# Patient Record
Sex: FEMALE | Race: OTHER | Employment: FULL TIME | URBAN - METROPOLITAN AREA
[De-identification: names, ages, dates, MRNs, and addresses within clinical notes are randomized per-mention and may not be internally consistent; named-entity substitution may affect disease eponyms.]

---

## 2017-03-14 ENCOUNTER — APPOINTMENT (OUTPATIENT)
Dept: GENERAL RADIOLOGY | Age: 47
End: 2017-03-14
Attending: EMERGENCY MEDICINE
Payer: SUBSIDIZED

## 2017-03-14 ENCOUNTER — HOSPITAL ENCOUNTER (OUTPATIENT)
Age: 47
Setting detail: OBSERVATION
LOS: 1 days | Discharge: HOME | End: 2017-03-15
Attending: EMERGENCY MEDICINE | Admitting: FAMILY MEDICINE
Payer: SUBSIDIZED

## 2017-03-14 DIAGNOSIS — R55 SYNCOPE AND COLLAPSE: Primary | ICD-10-CM

## 2017-03-14 LAB
ALBUMIN SERPL BCP-MCNC: 3.6 G/DL (ref 3.4–5)
ALBUMIN/GLOB SERPL: 1 {RATIO} (ref 0.8–1.7)
ALP SERPL-CCNC: 69 U/L (ref 45–117)
ALT SERPL-CCNC: 24 U/L (ref 13–56)
ANION GAP BLD CALC-SCNC: 5 MMOL/L (ref 3–18)
AST SERPL W P-5'-P-CCNC: 16 U/L (ref 15–37)
ATRIAL RATE: 90 BPM
BASOPHILS # BLD AUTO: 0 K/UL (ref 0–0.1)
BASOPHILS # BLD: 0 % (ref 0–2)
BILIRUB SERPL-MCNC: 0.3 MG/DL (ref 0.2–1)
BUN SERPL-MCNC: 10 MG/DL (ref 7–18)
BUN/CREAT SERPL: 14 (ref 12–20)
CALCIUM SERPL-MCNC: 8.6 MG/DL (ref 8.5–10.1)
CALCULATED P AXIS, ECG09: 69 DEGREES
CALCULATED R AXIS, ECG10: 19 DEGREES
CALCULATED T AXIS, ECG11: 8 DEGREES
CHLORIDE SERPL-SCNC: 105 MMOL/L (ref 100–108)
CHOLEST SERPL-MCNC: 180 MG/DL
CK MB CFR SERPL CALC: NORMAL % (ref 0–4)
CK MB CFR SERPL CALC: NORMAL % (ref 0–4)
CK MB SERPL-MCNC: <1 NG/ML (ref 5–25)
CK MB SERPL-MCNC: <1 NG/ML (ref 5–25)
CK SERPL-CCNC: 35 U/L (ref 26–192)
CK SERPL-CCNC: 35 U/L (ref 26–192)
CO2 SERPL-SCNC: 29 MMOL/L (ref 21–32)
CREAT SERPL-MCNC: 0.74 MG/DL (ref 0.6–1.3)
DIAGNOSIS, 93000: NORMAL
DIFFERENTIAL METHOD BLD: ABNORMAL
EOSINOPHIL # BLD: 0 K/UL (ref 0–0.4)
EOSINOPHIL NFR BLD: 0 % (ref 0–5)
ERYTHROCYTE [DISTWIDTH] IN BLOOD BY AUTOMATED COUNT: 12.9 % (ref 11.6–14.5)
GLOBULIN SER CALC-MCNC: 3.7 G/DL (ref 2–4)
GLUCOSE SERPL-MCNC: 97 MG/DL (ref 74–99)
HCG UR QL: NEGATIVE
HCT VFR BLD AUTO: 37.4 % (ref 35–45)
HDLC SERPL-MCNC: 34 MG/DL (ref 40–60)
HDLC SERPL: 5.3 {RATIO} (ref 0–5)
HGB BLD-MCNC: 12.6 G/DL (ref 12–16)
LDLC SERPL CALC-MCNC: 126.2 MG/DL (ref 0–100)
LIPID PROFILE,FLP: ABNORMAL
LYMPHOCYTES # BLD AUTO: 13 % (ref 21–52)
LYMPHOCYTES # BLD: 1 K/UL (ref 0.9–3.6)
MCH RBC QN AUTO: 29.4 PG (ref 24–34)
MCHC RBC AUTO-ENTMCNC: 33.7 G/DL (ref 31–37)
MCV RBC AUTO: 87.2 FL (ref 74–97)
MONOCYTES # BLD: 0.7 K/UL (ref 0.05–1.2)
MONOCYTES NFR BLD AUTO: 10 % (ref 3–10)
NEUTS SEG # BLD: 5.6 K/UL (ref 1.8–8)
NEUTS SEG NFR BLD AUTO: 77 % (ref 40–73)
P-R INTERVAL, ECG05: 152 MS
PLATELET # BLD AUTO: 207 K/UL (ref 135–420)
PMV BLD AUTO: 10.1 FL (ref 9.2–11.8)
POTASSIUM SERPL-SCNC: 3.7 MMOL/L (ref 3.5–5.5)
PROT SERPL-MCNC: 7.3 G/DL (ref 6.4–8.2)
Q-T INTERVAL, ECG07: 352 MS
QRS DURATION, ECG06: 82 MS
QTC CALCULATION (BEZET), ECG08: 430 MS
RBC # BLD AUTO: 4.29 M/UL (ref 4.2–5.3)
SODIUM SERPL-SCNC: 139 MMOL/L (ref 136–145)
TRIGL SERPL-MCNC: 99 MG/DL (ref ?–150)
TROPONIN I SERPL-MCNC: <0.02 NG/ML (ref 0–0.04)
TROPONIN I SERPL-MCNC: <0.02 NG/ML (ref 0–0.04)
VENTRICULAR RATE, ECG03: 90 BPM
VLDLC SERPL CALC-MCNC: 19.8 MG/DL
WBC # BLD AUTO: 7.3 K/UL (ref 4.6–13.2)

## 2017-03-14 PROCEDURE — 81025 URINE PREGNANCY TEST: CPT | Performed by: EMERGENCY MEDICINE

## 2017-03-14 PROCEDURE — 93005 ELECTROCARDIOGRAM TRACING: CPT

## 2017-03-14 PROCEDURE — 74011250637 HC RX REV CODE- 250/637: Performed by: FAMILY MEDICINE

## 2017-03-14 PROCEDURE — 85025 COMPLETE CBC W/AUTO DIFF WBC: CPT | Performed by: EMERGENCY MEDICINE

## 2017-03-14 PROCEDURE — 99285 EMERGENCY DEPT VISIT HI MDM: CPT

## 2017-03-14 PROCEDURE — 71010 XR CHEST PORT: CPT

## 2017-03-14 PROCEDURE — 82550 ASSAY OF CK (CPK): CPT | Performed by: EMERGENCY MEDICINE

## 2017-03-14 PROCEDURE — 36415 COLL VENOUS BLD VENIPUNCTURE: CPT | Performed by: FAMILY MEDICINE

## 2017-03-14 PROCEDURE — 80061 LIPID PANEL: CPT | Performed by: FAMILY MEDICINE

## 2017-03-14 PROCEDURE — 65660000000 HC RM CCU STEPDOWN

## 2017-03-14 PROCEDURE — 80053 COMPREHEN METABOLIC PANEL: CPT | Performed by: EMERGENCY MEDICINE

## 2017-03-14 RX ORDER — ACETAMINOPHEN 325 MG/1
650 TABLET ORAL
Status: DISCONTINUED | OUTPATIENT
Start: 2017-03-14 | End: 2017-03-15 | Stop reason: HOSPADM

## 2017-03-14 RX ORDER — IBUPROFEN 200 MG
200 TABLET ORAL
COMMUNITY
End: 2017-08-09

## 2017-03-14 RX ORDER — SODIUM CHLORIDE 9 MG/ML
500 INJECTION, SOLUTION INTRAVENOUS ONCE
Status: COMPLETED | OUTPATIENT
Start: 2017-03-15 | End: 2017-03-15

## 2017-03-14 RX ADMIN — ACETAMINOPHEN 650 MG: 325 TABLET ORAL at 22:23

## 2017-03-14 NOTE — ED TRIAGE NOTES
BIBA from home for tired and weak for a couple days; medic states family told him that pt had a syncopal episode for unknown LOC. Pt is [place] speaking.

## 2017-03-14 NOTE — ROUTINE PROCESS
TRANSFER - IN REPORT:    Verbal report received from Jennyfer Perez RN(name) on [address]  being received from ED(unit) for routine progression of care      Report consisted of patients Situation, Background, Assessment and   Recommendations(SBAR). Information from the following report(s) SBAR, Kardex and MAR was reviewed with the receiving nurse. Opportunity for questions and clarification was provided. Assessment completed upon patients arrival to unit and care assumed.

## 2017-03-14 NOTE — ED NOTES
TRANSFER - OUT REPORT:    Verbal report given to MARICHUY Acosta (name) on Sandra Cohen  being transferred to  (unit) for routine progression of care       Report consisted of patients Situation, Background, Assessment and   Recommendations(SBAR). Information from the following report(s) SBAR, ED Summary and MAR was reviewed with the receiving nurse. Lines:   Peripheral IV 03/14/17 Left Antecubital (Active)   Site Assessment Clean, dry, & intact 3/14/2017  6:43 PM   Phlebitis Assessment 0 3/14/2017  6:43 PM   Infiltration Assessment 0 3/14/2017  6:43 PM   Dressing Status Clean, dry, & intact 3/14/2017  6:43 PM   Dressing Type Transparent 3/14/2017  6:43 PM   Hub Color/Line Status Patent; Flushed 3/14/2017  6:43 PM        Opportunity for questions and clarification was provided.       Patient transported with:   Registered Nurse

## 2017-03-14 NOTE — H&P
Admission History and Physical  [facility]      Patient: Melinda Swan MRN: [id number]  [facility]: [id number]    YOB: 1970  Age: 49 y.o. Sex: female      DOA: 3/14/2017       HPI:     Melinda Swan is a 49 y.o. female with PMH GERD and pertinent family history of sudden death at young age, now presenting with complaint of syncopal episode. Patient reports that she woke up yesterday morning with full body pain that worsened throughout the day. She was able to go to work, but stated that pain progressively worsened. Yesterday evening, she took 400mg of ibuprofen and was able to go to sleep. When she woke up this morning, she continued to feel full body pain, describing it as muscle pain. She took her daughter to school, then returned home and went to sleep. She awoke around 1100 and poured herself a soda. She sat on the couch and felt very warm, then experienced loss of consciousness. Her family was present during this episode, and stated that she was slumped over on the couch. Her brother lifted her up, and her body was noted to be completely flaccid. He put her on the ground and \"started CPR\" per family. They described this as pushing down on her chest a few times, and blowing in her mouth. When they were blowing into her mouth, she awoke. Her family is not certain if she was breathing or had a pulse during this episode. The patient reports that she only remembers sitting on the couch, then waking up on the ground with family all around her. The patient reports that she has been experiencing chest pain and shortness of breath for the past 5-6 months, which has started to become more frequent. She describes the pain as sharp and pinching, and lasting 1-2 minutes. Originally it would happen once per month or so, but has increased to approximately once weekly.   She has a pertinent family history of MI in her paternal uncle at age 46, stating that he  at work unexpectedly from a massive heart attack. Her brother is on several medications for hypertension and \"heart problems,\" but she is unsure about the drug names. Her paternal grandfather also had an MI in his [de-identified]. She does endorse occasional epigastric pain. She denied taking prilosec as suggested by outpatient doctor. She endorsed frequent belching and sour taste in mouth. Patient does not have health insurance, and would appreciate any assistance available. She denied nausea, vomiting, diarrhea, constipation, and sick contacts. She also denied history of anxiety or increase in stress. Review of Systems - General ROS: negative for  - chills, fever, night sweats, weight gain and weight loss  Psychological ROS: negative for - anxiety and depression  Ophthalmic ROS: negative for - blurry vision, decreased vision or loss of vision  ENT ROS: negative for - headaches, hearing change or visual changes  Hematological and Lymphatic ROS: negative for - bruising, jaundice  Respiratory ROS: negative for - cough, hemoptysis, shortness of breath, orthopnea, paroxysmal dyspnea, or wheezing  Cardiovascular ROS: chest pain, dyspnea on exertion negative for -  edema, loss of consciousness, or palpitations   Gastrointestinal ROS: abdominal pain negative for - blood in stools, change in stools, constipation, diarrhea, hematemesis, melena, nausea/vomiting or swallowing difficulty/pain  Genito-Urinary ROS: negative for - dysuria, hematuria or urinary frequency/urgency  Musculoskeletal ROS: negative for - joint pain, joint swelling or muscle pain  Neurological ROS: negative for - dizziness, headaches, numbness/tingling or weakness  Dermatological ROS: negative for - rash or skin lesion changes      No past medical history on file.     Past Surgical History:   Procedure Laterality Date    HX HYSTERECTOMY         Family History   Problem Relation Age of Onset    Breast Cancer Paternal Aunt     Ovarian Cancer Paternal Grandmother        Social History     Social History    Marital status:      Spouse name: N/A    Number of children: N/A    Years of education: N/A     Social History Main Topics    Smoking status: Never Smoker    Smokeless tobacco: Not on file    Alcohol use No    Drug use: No    Sexual activity: Not on file     Other Topics Concern    Not on file     Social History Narrative       No Known Allergies    Prior to Admission Medications   Prescriptions Last Dose Informant Patient Reported? Taking? HYDROcodone-acetaminophen (NORCO) 5-325 mg per tablet   No No   Sig: Take 1 tablet by mouth every four (4) hours as needed for Pain. Facility-Administered Medications: None       Physical Exam:     Patient Vitals for the past 24 hrs:   Temp Pulse Resp BP SpO2   03/14/17 1430 - 88 14 102/61 -   03/14/17 1415 - 88 16 100/58 -   03/14/17 1400 - 89 13 97/59 -   03/14/17 1345 - 88 15 99/57 -   03/14/17 1330 - 87 11 101/59 -   03/14/17 1315 - 88 13 105/64 -   03/14/17 1314 99.1 °F (37.3 °C) 88 11 95/55 94 %       Physical Exam:   General:  Alert and Responsive and in No acute distress. Primary language is Citizen of [place], but conversive in [place]. HEENT: Conjunctiva pink, sclera anicteric. EOMI. Pharynx moist, nonerythematous. Moist mucous membranes. Thyroid not enlarged, no nodules. No cervical, supraclavicular, occipital or submandibular lymphadenopathy. No other gross abnormalities present. CV:  RRR, no murmurs. No visible pulsations or thrills. RESP:  Unlabored breathing. Lungs clear to auscultation. no wheeze, rales, or rhonchi. Equal expansion bilaterally. ABD:  Soft, nondistended. Mild TTP in epigastric region. Mild TTP in LLQ, no rebound. No hepatosplenomegaly. No suprapubic tenderness. No CVA tenderness. RECTAL:  Patient declined  MS:  No joint deformity or instability. No atrophy. Neuro:  5/5 strength bilateral upper extremities and lower extremities. A+Ox3. Ext:  No edema.   2+ radial and dp pulses bilaterally. Skin:  No rashes, lesions, or ulcers. Good turgor. IMAGING:   CXR 3/14/2017  Impression:  1. No acute cardiopulmonary process. Recent Results (from the past 12 hour(s))   EKG, 12 LEAD, INITIAL    Collection Time: 03/14/17  1:55 PM   Result Value Ref Range    Ventricular Rate 90 BPM    Atrial Rate 90 BPM    P-R Interval 152 ms    QRS Duration 82 ms    Q-T Interval 352 ms    QTC Calculation (Bezet) 430 ms    Calculated P Axis 69 degrees    Calculated R Axis 19 degrees    Calculated T Axis 8 degrees    Diagnosis       Normal sinus rhythm  Possible Left atrial enlargement  Nonspecific ST and T wave abnormality  Abnormal ECG  No previous ECGs available  Confirmed by Ritu Moser MD, Irving Barajas ([id number]) on 3/14/2017 3:20:33 PM     CBC WITH AUTOMATED DIFF    Collection Time: 03/14/17  2:35 PM   Result Value Ref Range    WBC 7.3 4.6 - 13.2 K/uL    RBC 4.29 4. 20 - 5.30 M/uL    HGB 12.6 12.0 - 16.0 g/dL    HCT 37.4 35.0 - 45.0 %    MCV 87.2 74.0 - 97.0 FL    MCH 29.4 24.0 - 34.0 PG    MCHC 33.7 31.0 - 37.0 g/dL    RDW 12.9 11.6 - 14.5 %    PLATELET 654 634 - 960 K/uL    MPV 10.1 9.2 - 11.8 FL    NEUTROPHILS 77 (H) 40 - 73 %    LYMPHOCYTES 13 (L) 21 - 52 %    MONOCYTES 10 3 - 10 %    EOSINOPHILS 0 0 - 5 %    BASOPHILS 0 0 - 2 %    ABS. NEUTROPHILS 5.6 1.8 - 8.0 K/UL    ABS. LYMPHOCYTES 1.0 0.9 - 3.6 K/UL    ABS. MONOCYTES 0.7 0.05 - 1.2 K/UL    ABS. EOSINOPHILS 0.0 0.0 - 0.4 K/UL    ABS.  BASOPHILS 0.0 0.0 - 0.1 K/UL    DF AUTOMATED     METABOLIC PANEL, COMPREHENSIVE    Collection Time: 03/14/17  2:35 PM   Result Value Ref Range    Sodium 139 136 - 145 mmol/L    Potassium 3.7 3.5 - 5.5 mmol/L    Chloride 105 100 - 108 mmol/L    CO2 29 21 - 32 mmol/L    Anion gap 5 3.0 - 18 mmol/L    Glucose 97 74 - 99 mg/dL    BUN 10 7.0 - 18 MG/DL    Creatinine 0.74 0.6 - 1.3 MG/DL    BUN/Creatinine ratio 14 12 - 20      GFR est AA >60 >60 ml/min/1.73m2    GFR est non-AA >60 >60 ml/min/1.73m2    Calcium 8.6 8.5 - 10.1 MG/DL    Bilirubin, total 0.3 0.2 - 1.0 MG/DL    ALT (SGPT) 24 13 - 56 U/L    AST (SGOT) 16 15 - 37 U/L    Alk. phosphatase 69 45 - 117 U/L    Protein, total 7.3 6.4 - 8.2 g/dL    Albumin 3.6 3.4 - 5.0 g/dL    Globulin 3.7 2.0 - 4.0 g/dL    A-G Ratio 1.0 0.8 - 1.7     CARDIAC PANEL,(CK, CKMB & TROPONIN)    Collection Time: 03/14/17  2:35 PM   Result Value Ref Range    CK 35 26 - 192 U/L    CK - MB <1.0 <3.6 ng/ml    CK-MB Index Cannot be calulated 0.0 - 4.0 %    Troponin-I, Qt. <0.02 0.0 - 0.045 NG/ML   HCG URINE, QL    Collection Time: 03/14/17  2:55 PM   Result Value Ref Range    HCG urine, Ql. NEGATIVE  NEG           Assessment/Plan:   49 y.o. female with PMH GERD and pertinent family history of sudden death at young age, now presenting with complaint of syncopal episode. Syncopal Episode:  DDx includes dehydration vs arhythmia. Cardiac enzymes negative in the ED. History concerning for episodes of chest pain becoming more frequent, and unexpected death in family member at young age. EKG read by ED as concerning for ST depressions  In leads II, III, avF. No prior EKG for comparison. CXR with no acute cardiopulmonary findings. - Will admit to telemetry. - Will consult cardiology. - Will obtain serial troponins.   - Will obtain lipid profile, consider echo inpatient vs outpatient per cardiology rec. - Will repeat EKG in the AM.  - Will obtain orthostatic vital signs. GERD  - Patient not taking prilosec as suggested by PCP. - Will order gi cocktail. Barrier to Care:  Patient does not have health insurance. - Will consult case management for assistance. Diet: Redular  DVT Prophylaxis: SCDs  Code Status: FULL  Point of Contact: Liborio Graff (Relationship: brother) [phone]    Disposition and anticipated LOS: >/= 2 midnights.     Amada Sanchez MD  03/14/17    4:58 PM            Senior Addendum to History and Physical  [facility]      Patient: Uvaldo Bamberger MRN: [id number]  [facility]: [id number]    YOB: 1970  Age: 49 y.o. Sex: female      DOA: 3/14/2017       HPI:     Chris Hong is a 49 y.o. female with PMH of GERD and gestational diabetes, now presenting with complaint of syncope. Per patient she was home this morning sitting on the couch when she felt hot and pain all over her body at around 11:30AM. She then all of a sudden became flaccid, pale with \"cold, purple fingers\". She then took off her clothes because she was hot. Her brother then wanted to take her to ER and she stated that she wanted to be taken to patient first. She then became limp and was laid to the ground. Her sister gave her some sugar and started chest compressions and mouth to mouth resuscitation when she awoke. Her eyes and mouth were closed and she had no flailing movements, foaming at the mouth, loss of bowel or bladder function. She has no recollection of the conversation with brother and sister states she was \"out\" for about 5 minutes. After regaining consciousness she did not have any repeat episodes. She states she had a similar episode once after [employer] class several years ago but she did not lose consciousness and was back to normal after a glass of coffee. She does state that she has been having chest pain intermittently about once per week associated with shortness of breath. She denies any preceding stressful events but did have cold like symptoms and body aches yesterday that required her to call out of work. Family history is significant for brother with arrhythmias, grandfather who  of MI but he was >81yo. ROS: Denies any current chest pain, palpitations, shortness of breath,   ED course: cardiac panel, CBC, neg HCG, CMP. Cxray, EKG    Physical Exam:     Physical Exam:  General:  Alert and Responsive and in No acute distress. HEENT: Conjunctiva pink, sclera anicteric. PERRL. EOMI. Pharynx moist, nonerythematous. Moist mucous membranes.   Thyroid not enlarged, no nodules. Malar type facial rash. CV:  RRR, no murmurs. No visible pulsations or thrills. RESP:  Unlabored breathing. Lungs clear to auscultation. no wheeze, rales, or rhonchi. Equal expansion bilaterally. ABD:  Soft, non-distended, mild epigastric tenderness to palpation. Normoactive bowel sounds. No hepatosplenomegaly. No suprapubic tenderness. No CVA tenderness. RECTAL: per intern note  MS:  No joint deformity or instability. No atrophy. Neuro:  5/5 strength bilateral upper extremities and lower extremities. CN II-XII intact. Sensation grossly intact. A+Ox3. Ext:  No edema. 2+ radial and dp pulses bilaterally. Skin:  No rashes, lesions, or ulcers. Good turgor. Assessment/Plan:   49 y.o. female with PMH of GERD, now admitted for syncope workup, r/o cardiac pathology. Syncope with brief loss of consciousness. Differential includes vasovagal, arrhythmias, structural cardiac disease, ACS, seizures. EKG with possible ST depressions. Pregnancy ruled out.    --Admit to telemetry unit  --continue to trend serial troponins  --lipid profile  --Orthostatic blood pressures  --Cardiology consult, consider ECHO to r/o cardiac pathology inpatient vs outpatient  --consider EEG  --repeat EKG in AM    GERD, symptomatic  --Prilosec   --GI cocktail    Maryann Primrose, MD, PGY-2  [facility]  3/14/2017, 4:39 PM

## 2017-03-14 NOTE — ED NOTES
Pt brother has arrived ans speaks English and states that pt has been feeling weak and having body aches for days and this morning she drank a Sprite and then became really pale and passed out for about 5 min.

## 2017-03-14 NOTE — ED PROVIDER NOTES
HPI Comments: 49year old female presetns with syncope. Pt has not been feeling well fora few days and was at a table this morning  When she went to get some sprite, went and sat on the couch and then  passed out. She denies any headache chest pain sob or abd pain prior to or after the event. No other aggravating or alleviating factors. No other associated symptoms. Patient is a 49 y.o. female presenting with lethargy. Lethargy          No past medical history on file. Past Surgical History:   Procedure Laterality Date    HX HYSTERECTOMY           Family History:   Problem Relation Age of Onset    Breast Cancer Paternal Aunt     Ovarian Cancer Paternal Grandmother        Social History     Social History    Marital status:      Spouse name: N/A    Number of children: N/A    Years of education: N/A     Occupational History    Not on file. Social History Main Topics    Smoking status: Never Smoker    Smokeless tobacco: Not on file    Alcohol use No    Drug use: No    Sexual activity: Not on file     Other Topics Concern    Not on file     Social History Narrative         ALLERGIES: Review of patient's allergies indicates no known allergies. Review of Systems   All other systems reviewed and are negative. Vitals:    03/14/17 1345 03/14/17 1400 03/14/17 1415 03/14/17 1430   BP: 99/57 97/59 100/58 102/61   Pulse: 88 89 88 88   Resp: 15 13 16 14   Temp:       SpO2:       Weight:       Height:                Physical Exam   Constitutional: She is oriented to person, place, and time. She appears well-developed. HENT:   Head: Normocephalic and atraumatic. Eyes: EOM are normal. Pupils are equal, round, and reactive to light. Neck: Normal range of motion. Neck supple. Cardiovascular: Normal rate, regular rhythm and normal heart sounds. Exam reveals no friction rub. No murmur heard. Pulmonary/Chest: Effort normal and breath sounds normal. No respiratory distress.  She has no wheezes. Abdominal: Soft. She exhibits no distension. There is no tenderness. There is no rebound and no guarding. Musculoskeletal: Normal range of motion. Neurological: She is alert and oriented to person, place, and time. Skin: Skin is warm and dry. Psychiatric: She has a normal mood and affect. Her behavior is normal. Thought content normal.        MDM  Number of Diagnoses or Management Options  Syncope and collapse:   Diagnosis management comments: EKG nsr at 90;nl axis. Nl int no konrad/ no hypertrophy. ST dep III, II, F TWI 3. No prior. Syncope; neg fam hx of sudden cardiac death.   + sig fam hx of sudden death ~ 50-s ? MI.   cxr napd. Will d/c PFM for admission. Syncope may be orthostatic but EKG with inf depressions is concerning, no priors.      ED Course       Procedures

## 2017-03-14 NOTE — ED NOTES
Attempted to pull GI cocktail to give to pt, but could not because [facility] was out of med.  [phone] who said they would come refill the med

## 2017-03-14 NOTE — IP AVS SNAPSHOT
Wanda Ward 
 
 
 [address] [address] Bucky Reyke Patient: Linda Dubose MRN: [id number] :1970 You are allergic to the following No active allergies Recent Documentation Height Weight Breastfeeding? BMI OB Status Smoking Status 1.626 m 63.5 kg No 24.03 kg/m2 Hysterectomy Never Smoker Emergency Contacts Name Discharge Info Relation Home Work Mobile Dave Orosco  Other Relative [6] [phone] About your hospitalization You were admitted on:  2017 You last received care in the:  [address] [place] [address] You were discharged on:  March 15, 2017 Unit phone number:  [phone] Why you were hospitalized Your primary diagnosis was:  Syncope And Collapse Your diagnoses also included:  Syncope Providers Seen During Your Hospitalizations Provider Role Specialty Primary office phone Charo Webster MD Attending Provider Emergency Medicine [phone] Lola Ball MD Attending Provider [facility] [phone] Your Primary Care Physician (PCP) Primary Care Physician Office Phone Office Fax lon [facility] [phone] Follow-up Information Follow up With Details Comments Contact Info Cecy Merino MD  office will call patient if able to schedule appointment, if unable, office  will call  to find new PCP. [address] Larissa Roger [facility] [address] [phone] Mari Mart MD On 3/30/2017 at 8:20am 27 [facility] [address] [place] [address] 
[phone] Current Discharge Medication List  
  
CONTINUE these medications which have NOT CHANGED Dose & Instructions Dispensing Information Comments Morning Noon Evening Bedtime  
 ibuprofen 200 mg tablet Commonly known as:  MOTRIN Your last dose was:     
   
Your next dose is:    
   
   
 Dose:  200 mg  
 Take 200 mg by mouth two (2) times daily as needed for Pain (pain as needed). Refills:  0 Discharge Instructions DISCHARGE SUMMARY from Nurse The following personal items are in your possession at time of discharge: 
 
Dental Appliances: None Visual Aid: None Home Medications: None Jewelry: None Clothing: At bedside, Other (comment) Other Valuables: At bedside, Cell Phone Personal Items Sent to Safe:  (none) PATIENT INSTRUCTIONS: 
 
 
F-face looks uneven A-arms unable to move or move unevenly S-speech slurred or non-existent T-time-call 911 as soon as signs and symptoms begin-DO NOT go Back to bed or wait to see if you get better-TIME IS BRAIN. Warning Signs of HEART ATTACK Call 911 if you have these symptoms: 
? Chest discomfort. Most heart attacks involve discomfort in the center of the chest that lasts more than a few minutes, or that goes away and comes back. It can feel like uncomfortable pressure, squeezing, fullness, or pain. ? Discomfort in other areas of the upper body. Symptoms can include pain or discomfort in one or both arms, the back, neck, jaw, or stomach. ? Shortness of breath with or without chest discomfort. ? Other signs may include breaking out in a cold sweat, nausea, or lightheadedness. Don't wait more than five minutes to call [address]! Fast action can save your life. Calling 911 is almost always the fastest way to get lifesaving treatment. Emergency Medical Services staff can begin treatment when they arrive  up to an hour sooner than if someone gets to the hospital by car. The discharge information has been reviewed with the patient and family. The patient and family verbalized understanding. Discharge medications reviewed with the patient and family and appropriate educational materials and side effects teaching were provided. Patient armband removed and shredded Desmayos: Instrucciones de cuidado - [ Fainting: Care Instructions ] Instrucciones de cuidado Cuando se desmaya, o pierde el conocimiento, usted está inconsciente por un corto tiempo. El desmayo suele ser causado por tayo breve disminución del flujo sanguíneo al cerebro. Al caer o quedar tendido, le fluye más nakia al cerebro y recupera el conocimiento. El estrés emocional, el dolor o el acaloramiento, sobre todo si rodriguez estado de pie, pueden hacer que se Cite El Mahrsi 1. En estos casos, el desmayo no suele ser grave. Hermilo el Dodson Sachs puede ser tayo señal de un problema más grave. Quizás bower médico quiera hacerle más pruebas para descartar otras causas. El tratamiento que requiera depende de la causa del Dodson Sachs. El médico lo rodriguez examinado minuciosamente, hermilo pueden presentarse problemas más tarde. Si nota algún problema o nuevos síntomas, busque tratamiento médico de inmediato. La atención de seguimiento es tayo parte clave de bower tratamiento y seguridad. Asegúrese de hacer y acudir a todas las citas, y llame a bower médico si está teniendo problemas. También es tayo buena idea saber los resultados de graeme exámenes y mantener tayo lista de los medicamentos que juliette. Cómo puede cuidarse en el hogar? · Stephanie líquidos en abundancia para prevenir la deshidratación. Si tiene tayo enfermedad renal, cardíaca o hepática y tiene que restringir los líquidos, hable con bower médico antes de aumentar bower consumo de líquidos. Cuándo debe pedir ayuda? Llame al 911 en cualquier momento que piense que puede necesitar atención de [place]. Por ejemplo, llame si: · Tiene síntomas de un problema del corazón. Estos pueden incluir: ¨ Dolor o presión en el pecho. ¨ Graves dificultades para respirar. ¨ Latidos cardíacos rápidos o irregulares. ¨ Aturdimiento o debilidad repentina. ¨ Toser mucosidad espumosa y de DILIP Tran. ¨ Desmayos. Cuando llame al 911, quizás la operadora le diga que mastique 1 aspirina para adultos o de 2 a 4 aspirinas de dosis baja. Espere a la ambulancia. No trate de conducir usted mismo. · Tiene síntomas de un ataque cerebral. Estos pueden incluir: 
¨ Entumecimiento, hormigueo, debilitamiento o pérdida de movimiento repentinos en la guanako, el brazo o la pierna, sobre todo en un solo lado del cuerpo. ¨ Cambios repentinos en la visión. ¨ Dificultades repentinas para hablar. ¨ Confusión repentina o súbita dificultad para comprender frases sencillas. ¨ Problemas repentinos para caminar o mantener el equilibrio. ¨ Dolor de [place] intenso y repentino, distinto de los jenni de hudson anteriores. · Se desmayó (perdió el conocimiento) otra vez. Vigile de cerca los cambios en bower lionel y asegúrese de comunicarse con bower médico si: 
· No mejora shannan se esperaba. Dónde puede encontrar más información en inglés? Katharine Presto a [email or web address]/. Kenan Pope D735 en la búsqueda para aprender más acerca de \"Desmayos: Instrucciones de cuidado - [ Fainting: Care Instructions ]. \" 
Revisado: 27 [place], 2016 Versión del contenido: 11.1 © 2417-3791 [employer]IPO, Incorporated. Las instrucciones de cuidado fueron adaptadas bajo licencia por Good Help Connections (which disclaims liability or warranty for this information). Si usted tiene [place] afección médica o sobre estas instrucciones, siempre pregunte a bower profesional de lionel. [facility], Incorporated niega toda garantía o responsabilidad por bower uso de esta información. Aturdimiento o desmayo: Instrucciones de cuidado - [ Oilton Zeb or Faintness: Care Instructions ] Instrucciones de cuidado El aturdimiento es la sensación de que está a punto de desmayarse o de \"perder el conocimiento\". No se siente shannan si usted o lo que le rodea se [place]. Es distinto del vértigo, que es la sensación de que usted o las cosas que le rodean dan vueltas o se inclinan. El aturdimiento suele desaparecer o mejorar cuando se acuesta. Si el aturdimiento empeora, esto puede conducir a un desvanecimiento. Es común sentirse aturdido de [employer]. Por lo general, el aturdimiento no se debe a un problema grave. A menudo es causado por tayo disminución de corta duración de la presión arterial y el flujo de nakia hacia la hudson que se produce al ponerse de pie con demasiada rapidez cuando está acostado o sentado. La atención de seguimiento es tayo parte clave de bower tratamiento y seguridad. Asegúrese de hacer y acudir a todas las citas, y llame a bower médico si está teniendo problemas. También es tayo buena idea saber los resultados de graeme exámenes y mantener tayo lista de los medicamentos que juliette. Cómo puede cuidarse en el hogar? · Acuéstese efraín 1 o 2 minutos cuando se sienta aturdido. Después de acostarse, siéntese lentamente y permanezca sentado de 1 a 2 minutos antes de ponerse de pie lentamente. · Evite los movimientos, las posturas o las actividades que le hayan producido aturdimiento en el pasado. · Descanse mucho, en especial si está resfriado o tiene gripe, ya que estas pueden causar aturdimiento. · Asegúrese de beber abundante líquido, en especial si tiene fiebre o si ha estado sudando. · No conduzca ni se ponga a sí mismo o ponga a otros en peligro mientras se sienta aturdido. Cuándo debe pedir ayuda? Llame al 911 en cualquier momento que considere que necesita atención de [place]. Por ejemplo, llame si: · Tiene síntomas de un ataque cerebral. Estos pueden incluir: 
¨ Entumecimiento, hormigueo, debilidad o parálisis repentinos en la guanako, el brazo o la pierna, sobre todo si ocurre en un solo lado del cuerpo. ¨ Cambios súbitos en la vista. ¨ Problemas repentinos para hablar. ¨ Confusión súbita o dificultad repentina para comprender frases sencillas. ¨ Problemas repentinos para caminar o mantener el equilibrio. ¨ Un dolor de hudson intenso y repentino, distinto a los jenni de hudson anteriores. · Tiene síntomas de un ataque al corazón. Estos podrían incluir: ¨ Dolor o presión en el pecho, o tayo sensación extraña en el pecho. ¨ Sudoración. ¨ Falta de aire. ¨ Náuseas o vómito. ¨ Dolor, presión o tayo sensación extraña en la espalda, el martina, la mandíbula, la parte superior del abdomen, o en ryan o ambos hombros o brazos. ¨ Aturdimiento o debilidad repentina. ¨ Latidos cardíacos rápidos o irregulares. Cuando llame al 911, es posible que le digan que mastique 1 aspirina para adultos o 2 a 4 aspirinas de dosis baja. Espere a la ambulancia. No trate de conducir usted mismo un automóvil. Preste especial atención a los cambios en bower lionel y asegúrese de comunicarse con bower médico si: 
· El aturdimiento Faylene Dyke o no mejora con los cuidados en el hogar. Dónde puede encontrar más información en inglés? Jaden Safer a [email or web address] Piter Gaspar U104 en la búsqueda para aprender más acerca de \"Aturdimiento o desmayo: Instrucciones de cuidado - [ Sandria Curling or Faintness: Care Instructions ]. \" 
Revisado: 27 [place], 2016 Versión del contenido: 11.1 © 7066-0664 Sessions, Incorporated. Las instrucciones de cuidado fueron adaptadas bajo licencia por Good [employer] Connections (which disclaims liability or warranty for this information). Si usted tiene [place] afección médica o sobre estas instrucciones, siempre pregunte a bower profesional de lionel. [facility], Incorporated niega toda garantía o responsabilidad por bower uso de esta información. Discharge Orders Procedure Order Date Status Priority Quantity Spec Type Associated Dx ACTIVITY AFTER DISCHARGE Patient should: Resume activity as tolerated. 03/15/17 1504 Normal Routine 1 Questions: Patient should:  Resume activity as tolerated. DIET REGULAR 03/15/17 1504 Normal Routine 1 Buzzilla Announcement We are excited to announce that we are making your provider's discharge notes available to you in Buzzilla. You will see these notes when they are completed and signed by the physician that discharged you from your recent hospital stay. If you have any questions or concerns about any information you see in Buzzilla, please call the Health Information Department where you were seen or reach out to your Primary Care Provider for more information about your plan of care. Introducing [facility]! [facility] introduce portal paciente [employer] . Ahora se puede acceder a partes de bower expediente médico, enviar por correo electrónico la oficina de bower médico y solicitar renovaciones de medicamentos en línea. En bower navegador de Internet , Shikha campos [email or web address] Hung clic en el usuario por [facility]? Ana Lilia Charlie clic aquí en la sesión Jovanny Roche. Verá la página de registro [place]. Ingrese bower código de [facility] shady y shannan aparece a continuación. Usted no tendrá que [employer] código después de gigi completado el proceso de registro . Si usted no se inscribe antes de la fecha de caducidad , debe solicitar un nuevo código. · MyCS[place] Código de acceso : [id number] Expires: 6/12/2017  2:05 PM 
 
Ingresa los últimos cuatro dígitos de bower Número de Seguro Social ( xxxx ) y fecha de nacimiento ( dd / mm / aaaa ) shannan se indica y hung clic en Enviar. Usted será llevado a la siguiente página de registro . Crear un ID [place] .  Esta será bower ID de inicio de sesión de MyChart y no puede ser [place] , por lo que pensar en tayo que es mendoza y fácil de recordar . Crear tayo contraseña MyChart . Usted puede cambiar bower contraseña en cualquier momento . Ingrese bower Password Reset de preguntas y [place] . [place] se puede utilizar en un momento posterior si usted olvida bower contraseña. Introduzca bower dirección de correo electrónico . Thurl Ash recibirá tayo notificación por correo electrónico cuando la nueva información está disponible en MyChart . Ana Skeltone clic en Registrarse. Manuel Smiles ivania y descargar porciones de bower expediente médico. 
María clic en el enlace de descarga del menú Resumen para descargar tayo copia portátil de bower información médica . Si tiene [employer] , por favor visite la sección de preguntas frecuentes del sitio web MyChart . Recuerde, MyChart NO es que se utilizará para las necesidades urgentes. Para emergencias médicas , llame al 911 . Ahora disponible en bower iPhone y Android ! General Information Please provide this summary of care documentation to your next provider. Patient Signature:  ____________________________________________________________ Date:  ____________________________________________________________  
  
[facility] Provider Signature:  ____________________________________________________________ Date:  ____________________________________________________________  
  
  
   
  
[address] [address] Bucky Smallwood Patient: Alon Varghese MRN: [id number] :1970 Tiene alergias a lo siguiente No tiene alergias Documentación recientes Height Weight Está amamantando? BMI [facility]) Estado obstétrico Estatus de tabaquísmo 1.626 m 63.5 kg No 24.03 kg/m2 Hysterectomy Never Smoker Emergency Contacts Name Discharge Info Relation Home Work Mobile Dave Orosco  Other Relative [6] [phone] Sobre bower hospitalización Le admitieron el:  March 14, 2017 Marx tratamiento más reciente fue el:  [facility] [address] [place] Le dieron de dunia el:  March 15, 2017 Número de teléfono de la unidad:  [phone] Por qué le ingresaron Marx diagnosis primaria es:  Syncope And Collapse Marx diagnosis también incluye:  Syncope Proveedores de verse efraín graeme hospitalizaciones Personal Médico Rol Especialidad Teléfono principal de la oficina Joaquín Hernandez MD Attending Provider Emergency Medicine [phone] Ra Bennett MD Attending Provider [facility] [phone] Marx médico de atención primaria (PCP ) Primary Care Physician Office Phone Office Fax Fomichelle Haydee [phone] Follow-up Information Follow up With Details Comments Contact Info Brian Park MD  office will call patient if able to schedule appointment, if unable, office  will call  to find new PCP. [address] Larissa Roger [facility] [address] 
[phone] Lizz Landa MD On 3/30/2017 at 8:20am 27 [facility] [address] [place] [address] 
[phone] Aprobación de la gestión actual lista de medicamentos CONTINUAR estos medicamentos que no [place] Dosis e instrucciones Información de dispensación Comentarios Morning Noon Evening Bedtime  
 ibuprofen 200 mg tablet También conocido shannan:  MOTRIN Your last dose was: Your next dose is:    
   
   
 Dosis:  200 mg Take 200 mg by mouth two (2) times daily as needed for Pain (pain as needed). recargas:  0 Discharge Instructions DISCHARGE SUMMARY from Nurse The following personal items are in your possession at time of discharge: 
 
Dental Appliances: None Visual Aid: None Home Medications: None Jewelry: None Clothing: At bedside, Other (comment) Other Valuables: At bedside, Cell Phone Personal Items Sent to Safe:  (none) PATIENT INSTRUCTIONS: 
 
 
F-face looks uneven A-arms unable to move or move unevenly S-speech slurred or non-existent T-time-call 911 as soon as signs and symptoms begin-DO NOT go Back to bed or wait to see if you get better-TIME IS BRAIN. Warning Signs of HEART ATTACK Call 911 if you have these symptoms: 
? Chest discomfort. Most heart attacks involve discomfort in the center of the chest that lasts more than a few minutes, or that goes away and comes back. It can feel like uncomfortable pressure, squeezing, fullness, or pain. ? Discomfort in other areas of the upper body. Symptoms can include pain or discomfort in one or both arms, the back, neck, jaw, or stomach. ? Shortness of breath with or without chest discomfort. ? Other signs may include breaking out in a cold sweat, nausea, or lightheadedness. Don't wait more than five minutes to call [address]! Fast action can save your life. Calling 911 is almost always the fastest way to get lifesaving treatment. Emergency Medical Services staff can begin treatment when they arrive  up to an hour sooner than if someone gets to the hospital by car. The discharge information has been reviewed with the patient and family. The patient and family verbalized understanding. Discharge medications reviewed with the patient and family and appropriate educational materials and side effects teaching were provided. Patient armband removed and shredded Desmayos: Instrucciones de cuidado - [ Fainting: Care Instructions ] Instrucciones de cuidado Cuando se desmaya, o pierde el conocimiento, usted está inconsciente por un corto tiempo. El desmayo suele ser causado por tayo breve disminución del flujo sanguíneo al cerebro. Al caer o quedar tendido, le fluye más nakia al cerebro y recupera el conocimiento. El estrés emocional, el dolor o el acaloramiento, sobre todo si rodriguez estado de pie, pueden hacer que se Cite El Mahrsi 1. En estos casos, el desmayo no suele ser grave. Hermilo el Dodson Sachs puede ser tayo señal de un problema más grave. Quizás bower médico quiera hacerle más pruebas para descartar otras causas. El tratamiento que requiera depende de la causa del Dodson Sachs. El médico lo rodriguez examinado minuciosamente, hermilo pueden presentarse problemas más tarde. Si nota algún problema o nuevos síntomas, busque tratamiento médico de inmediato. La atención de seguimiento es tayo parte clave de bower tratamiento y seguridad. Asegúrese de hacer y acudir a todas las citas, y llame a bower médico si está teniendo problemas. También es tayo buena idea saber los resultados de graeme exámenes y mantener tayo lista de los medicamentos que juliette. Cómo puede cuidarse en el hogar? · Stephanie líquidos en abundancia para prevenir la deshidratación. Si tiene tayo enfermedad renal, cardíaca o hepática y tiene que restringir los líquidos, hable con bower médico antes de aumentar bower consumo de líquidos. Cuándo debe pedir ayuda? Llame al 911 en cualquier momento que piense que puede necesitar atención de [place]. Por ejemplo, llame si: · Tiene síntomas de un problema del corazón. Estos pueden incluir: ¨ Dolor o presión en el pecho. ¨ Graves dificultades para respirar. ¨ Latidos cardíacos rápidos o irregulares. ¨ Aturdimiento o debilidad repentina. ¨ Toser mucosidad espumosa y de DILIP Tran. ¨ Desmayos. Cuando llame al 911, quizás la operadora le diga que mastique 1 aspirina para adultos o de 2 a 4 aspirinas de dosis baja. Espere a la ambulancia. No trate de conducir usted mismo. · Tiene síntomas de un ataque cerebral. Estos pueden incluir: ¨ Entumecimiento, hormigueo, debilitamiento o pérdida de movimiento repentinos en la guanako, el brazo o la pierna, sobre todo en un solo lado del cuerpo. ¨ Cambios repentinos en la visión. ¨ Dificultades repentinas para hablar. ¨ Confusión repentina o súbita dificultad para comprender frases sencillas. ¨ Problemas repentinos para caminar o mantener el equilibrio. ¨ Dolor de [place] intenso y repentino, distinto de los jenni de hudson anteriores. · Se desmayó (perdió el conocimiento) otra vez. Vigile de cerca los cambios en bower lionel y asegúrese de comunicarse con bower médico si: 
· No mejora shannan se esperaba. Dónde puede encontrar más información en inglés? Good [place] a [email or web address]/. Glorya Sacks D880 en la búsqueda para aprender más acerca de \"Desmayos: Instrucciones de cuidado - [ Fainting: Care Instructions ]. \" 
Revisado: 27 [place], 2016 Versión del contenido: 11.1 © 3502-0448 Healthwise, Incorporated. Las instrucciones de cuidado fueron adaptadas bajo licencia por Good Help Connections (which disclaims liability or warranty for this information). Si usted tiene [place] afección médica o sobre estas instrucciones, siempre pregunte a bower profesional de lionel. Healthwise, Incorporated niega toda garantía o responsabilidad por bower uso de esta información. Aturdimiento o desmayo: Instrucciones de cuidado - [ [place] or Faintness: Care Instructions ] Instrucciones de cuidado El aturdimiento es la sensación de que está a punto de desmayarse o de \"perder el conocimiento\". No se siente shannan si usted o lo que le rodea se [place]. Es distinto del vértigo, que es la sensación de que usted o las cosas que le rodean dan vueltas o se inclinan. El aturdimiento suele desaparecer o mejorar cuando se acuesta. Si el aturdimiento empeora, esto puede conducir a un desvanecimiento. Es común sentirse aturdido de [employer].  Por lo general, el aturdimiento no se debe a un problema grave. A menudo es causado por tayo disminución de corta duración de la presión arterial y el flujo de nakia hacia la [place]udson que se produce al ponerse de pie con demasiada rapidez cuando está acostado o sentado. La atención de seguimiento es tayo parte clave de bower tratamiento y seguridad. Asegúrese de hacer y acudir a todas las citas, y llame a bower médico si está teniendo problemas. También es tayo buena idea saber los resultados de graeme exámenes y mantener tayo lista de los medicamentos que juliette. Cómo puede cuidarse en el hogar? · Acuéstese efraín 1 o 2 minutos cuando se sienta aturdido. Después de acostarse, siéntese lentamente y permanezca sentado de 1 a 2 minutos antes de ponerse de pie lentamente. · Evite los movimientos, las posturas o las actividades que le hayan producido aturdimiento en el pasado. · Descanse mucho, en especial si está resfriado o tiene gripe, ya que estas pueden causar aturdimiento. · Asegúrese de beber abundante líquido, en especial si tiene fiebre o si ha estado sudando. · No conduzca ni se ponga a sí mismo o ponga a otros en peligro mientras se sienta aturdido. Cuándo debe pedir ayuda? Llame al 911 en cualquier momento que considere que necesita atención de [place]. Por ejemplo, llame si: · Tiene síntomas de un ataque cerebral. Estos pueden incluir: 
¨ Entumecimiento, hormigueo, debilidad o parálisis repentinos en la guanako, el brazo o la pierna, sobre todo si ocurre en un solo lado del cuerpo. ¨ Cambios súbitos en la vista. ¨ Problemas repentinos para hablar. ¨ Confusión súbita o dificultad repentina para comprender frases sencillas. ¨ Problemas repentinos para caminar o mantener el equilibrio. ¨ Un dolor de [place] intenso y repentino, distinto a los jenni de hudson anteriores. · Tiene síntomas de un ataque al corazón. Estos podrían incluir: ¨ Dolor o presión en el pecho, o tayo sensación extraña en el pecho. ¨ Sudoración. ¨ Falta de aire. ¨ Náuseas o vómito. ¨ Dolor, presión o tayo sensación extraña en la espalda, el martina, la mandíbula, la parte superior del abdomen, o en ryan o ambos hombros o brazos. ¨ Aturdimiento o debilidad repentina. ¨ Latidos cardíacos rápidos o irregulares. Cuando llame al 911, es posible que le digan que mastique 1 aspirina para adultos o 2 a 4 aspirinas de dosis baja. Espere a la ambulancia. No trate de conducir usted mismo un automóvil. Preste especial atención a los cambios en bower lionel y asegúrese de comunicarse con bower médico si: 
· El aturdimiento Bird Borrow o no mejora con los cuidados en el hogar. Dónde puede encontrar más información en inglés? Santos Lianna a [email or web address] Haven Anger A327 en la búsqueda para aprender más acerca de \"Aturdimiento o desmayo: Instrucciones de cuidado - [ Tonja Garnett or Faintness: Care Instructions ]. \" 
Revisado: 27 [address], 2016 Versión del contenido: 11.1 © 5416-5341 Healthwise, Incorporated. Las instrucciones de cuidado fueron adaptadas bajo licencia por [employer] (which disclaims liability or warranty for this information). Si usted tiene Art Kalaheo afección médica o sobre estas instrucciones, siempre pregunte a bower profesional de lionel. Healthwise, Incorporated niega toda garantía o responsabilidad por bower uso de esta información. Discharge Orders Procedure Order Date Status Priority Quantity Spec Type Associated Dx ACTIVITY AFTER DISCHARGE Patient should: Resume activity as tolerated. 03/15/17 1504 Normal Routine 1 Questions: Patient should:  Resume activity as tolerated. DIET REGULAR 03/15/17 1504 Normal Routine 1 Worlds Announcement We are excited to announce that we are making your provider's discharge notes available to you in MyChart.   You will see these notes when they are completed and signed by the physician that discharged you from your recent hospital stay. If you have any questions or concerns about any information you see in MyChart, please call the Health Information Department where you were seen or reach out to your Primary Care Provider for more information about your plan of care. Introducing \[facility] [place] [facility]\"" HEALTH SERVICES! Blaine Contreras introduce portal paciente MyChart . Ahora se puede acceder a partes de bower expediente médico, enviar por correo electrónico la oficina de bower médico y solicitar renovaciones de medicamentos en línea. En bower navegador de Internet , Alber Due a [email or web address] Hung clic en el usuario por Norman Bail? Riaz Jimenezoner clic aquí en la sesión Pamalee Halo. Verá la página de registro [place]. Ingrese bower código de [employer] shady y shannan aparece a continuación. Usted no tendrá que [employer] código después de gigi completado el proceso de registro . Si usted no se inscribe antes de la fecha de caducidad , debe solicitar un nuevo código. · MyChart Código de acceso : [id number] Expires: 6/12/2017  2:05 PM 
 
Ingresa los últimos cuatro dígitos de bower Número de Seguro Social ( xxxx ) y fecha de nacimiento ( dd / mm / aaaa ) shannan se indica y hung clic en Enviar. Usted será llevado a la siguiente página de registro . Crear un ID MyChart . Esta será bower ID de inicio de sesión de MyChart y no puede ser [place] , por lo que pensar en tayo que es Naresh Aldair y fácil de recordar . Crear tayo contraseña MyChart . Usted puede cambiar bower contraseña en cualquier momento . Ingrese bower Password Reset de preguntas y [place] . [place] se puede utilizar en un momento posterior si usted olvida bower contraseña. Introduzca bower dirección de correo electrónico . Jerry Cherise recibirá tayo notificación por correo electrónico cuando la nueva información está disponible en MyChart . Kathline Perfect clic en Registrarse.  Rossi Rhyme ivania y descargar porciones de bower expediente Rinda Lindsay zabala en el enlace de descarga del menú Resumen para descargar Raciel Brito copia portátil de bower información médica . Si tiene [employer] , por favor visite la sección de preguntas frecuentes del sitio web MyChart . Gaby Gonzalez NO es que se utilizará para las necesidades urgentes. Para emergencias médicas , llame al 911 . Ahora disponible en bower iPhone y Android ! General Information Please provide this summary of care documentation to your next provider. Patient Signature:  ____________________________________________________________ Date:  ____________________________________________________________  
  
[facility] Signature:  ____________________________________________________________ Date:  ____________________________________________________________

## 2017-03-14 NOTE — IP AVS SNAPSHOT
Current Discharge Medication List  
  
CONTINUE these medications which have NOT CHANGED Dose & Instructions Dispensing Information Comments Morning Noon Evening Bedtime  
 ibuprofen 200 mg tablet Commonly known as:  MOTRIN Your last dose was: Your next dose is:    
   
   
 Dose:  200 mg Take 200 mg by mouth two (2) times daily as needed for Pain (pain as needed). Refills:  0

## 2017-03-14 NOTE — Clinical Note
Status[de-identified] Inpatient [101] Type of Bed: Telemetry [19] Inpatient Hospitalization Certified Necessary for the Following Reasons: 3. Patient receiving treatment that can only be provided in an inpatient setting (further clarification in H&P documentation) Admitting Diagnosis: Syncope and collapse [780. 2. ICD-9-CM] Admitting Physician: Anita Morales [[id number]] Attending Physician: Anita Morales [[id number]] Estimated Length of Stay: > or = to 2 Midnights Discharge Plan[de-identified] Home with Office Follow-up

## 2017-03-15 VITALS
SYSTOLIC BLOOD PRESSURE: 109 MMHG | OXYGEN SATURATION: 95 % | HEIGHT: 64 IN | WEIGHT: 140 LBS | RESPIRATION RATE: 18 BRPM | BODY MASS INDEX: 23.9 KG/M2 | TEMPERATURE: 98.2 F | HEART RATE: 89 BPM | DIASTOLIC BLOOD PRESSURE: 69 MMHG

## 2017-03-15 LAB
ANION GAP BLD CALC-SCNC: 5 MMOL/L (ref 3–18)
BASOPHILS # BLD AUTO: 0 K/UL (ref 0–0.1)
BASOPHILS # BLD: 1 % (ref 0–2)
BUN SERPL-MCNC: 9 MG/DL (ref 7–18)
BUN/CREAT SERPL: 12 (ref 12–20)
CALCIUM SERPL-MCNC: 8.5 MG/DL (ref 8.5–10.1)
CHLORIDE SERPL-SCNC: 105 MMOL/L (ref 100–108)
CK MB CFR SERPL CALC: NORMAL % (ref 0–4)
CK MB SERPL-MCNC: <1 NG/ML (ref 5–25)
CK SERPL-CCNC: 48 U/L (ref 26–192)
CO2 SERPL-SCNC: 32 MMOL/L (ref 21–32)
CREAT SERPL-MCNC: 0.73 MG/DL (ref 0.6–1.3)
DIFFERENTIAL METHOD BLD: ABNORMAL
EOSINOPHIL # BLD: 0 K/UL (ref 0–0.4)
EOSINOPHIL NFR BLD: 0 % (ref 0–5)
ERYTHROCYTE [DISTWIDTH] IN BLOOD BY AUTOMATED COUNT: 13.1 % (ref 11.6–14.5)
GLUCOSE SERPL-MCNC: 83 MG/DL (ref 74–99)
HCT VFR BLD AUTO: 37.4 % (ref 35–45)
HGB BLD-MCNC: 12.1 G/DL (ref 12–16)
LYMPHOCYTES # BLD AUTO: 47 % (ref 21–52)
LYMPHOCYTES # BLD: 2.7 K/UL (ref 0.9–3.6)
MCH RBC QN AUTO: 29.1 PG (ref 24–34)
MCHC RBC AUTO-ENTMCNC: 32.4 G/DL (ref 31–37)
MCV RBC AUTO: 89.9 FL (ref 74–97)
MONOCYTES # BLD: 0.8 K/UL (ref 0.05–1.2)
MONOCYTES NFR BLD AUTO: 13 % (ref 3–10)
NEUTS SEG # BLD: 2.2 K/UL (ref 1.8–8)
NEUTS SEG NFR BLD AUTO: 39 % (ref 40–73)
PLATELET # BLD AUTO: 222 K/UL (ref 135–420)
PMV BLD AUTO: 10.7 FL (ref 9.2–11.8)
POTASSIUM SERPL-SCNC: 3.6 MMOL/L (ref 3.5–5.5)
RBC # BLD AUTO: 4.16 M/UL (ref 4.2–5.3)
SODIUM SERPL-SCNC: 142 MMOL/L (ref 136–145)
T4 FREE SERPL-MCNC: 0.9 NG/DL (ref 0.7–1.5)
TROPONIN I SERPL-MCNC: <0.02 NG/ML (ref 0–0.04)
TSH SERPL DL<=0.05 MIU/L-ACNC: 0.2 UIU/ML (ref 0.36–3.74)
WBC # BLD AUTO: 5.6 K/UL (ref 4.6–13.2)

## 2017-03-15 PROCEDURE — 84439 ASSAY OF FREE THYROXINE: CPT | Performed by: FAMILY MEDICINE

## 2017-03-15 PROCEDURE — 93306 TTE W/DOPPLER COMPLETE: CPT

## 2017-03-15 PROCEDURE — 78452 HT MUSCLE IMAGE SPECT MULT: CPT | Performed by: FAMILY MEDICINE

## 2017-03-15 PROCEDURE — 84443 ASSAY THYROID STIM HORMONE: CPT | Performed by: FAMILY MEDICINE

## 2017-03-15 PROCEDURE — 36415 COLL VENOUS BLD VENIPUNCTURE: CPT | Performed by: FAMILY MEDICINE

## 2017-03-15 PROCEDURE — 74011250636 HC RX REV CODE- 250/636: Performed by: FAMILY MEDICINE

## 2017-03-15 PROCEDURE — 93017 CV STRESS TEST TRACING ONLY: CPT | Performed by: FAMILY MEDICINE

## 2017-03-15 PROCEDURE — 93005 ELECTROCARDIOGRAM TRACING: CPT

## 2017-03-15 PROCEDURE — 82550 ASSAY OF CK (CPK): CPT | Performed by: FAMILY MEDICINE

## 2017-03-15 PROCEDURE — A9500 TC99M SESTAMIBI: HCPCS

## 2017-03-15 PROCEDURE — 99218 HC RM OBSERVATION: CPT

## 2017-03-15 PROCEDURE — 80048 BASIC METABOLIC PNL TOTAL CA: CPT | Performed by: FAMILY MEDICINE

## 2017-03-15 PROCEDURE — 85025 COMPLETE CBC W/AUTO DIFF WBC: CPT | Performed by: FAMILY MEDICINE

## 2017-03-15 PROCEDURE — 74011250637 HC RX REV CODE- 250/637: Performed by: FAMILY MEDICINE

## 2017-03-15 RX ADMIN — REGADENOSON 0.4 MG: 0.08 INJECTION, SOLUTION INTRAVENOUS at 09:55

## 2017-03-15 RX ADMIN — SODIUM CHLORIDE 500 ML: 900 INJECTION, SOLUTION INTRAVENOUS at 00:08

## 2017-03-15 RX ADMIN — ACETAMINOPHEN 650 MG: 325 TABLET ORAL at 12:54

## 2017-03-15 NOTE — ROUTINE PROCESS
Received report from Franci Hi RN. Received patient awake in bed, MD in room discussing care with family and patient. NO c/o pain or discomfort, Informed patient to call for assistance when needing to ambulated. Call bell within reach, bed in low position. See assessment. [id number] have reviewed discharge instructions with the patient and family. The patient and family verbalized understanding. Removed IV, no signs of pain or irritation. Pt discharged with no prescriptions, all appointments made. Pt discharge via wheelchair.

## 2017-03-15 NOTE — PROGRESS NOTES
BPA/MST Referral    Referral received in error. Patient has not had change in weight PTA. No nutrition intervention indicated at this time. Will re-screen as appropriate.      Tj Carr RD, [address] [place]

## 2017-03-15 NOTE — PROGRESS NOTES
Care Management Interventions  PCP Verified by CM: Yes  Palliative Care Consult (Criteria: CHF and RRAT>21): No  Reason for No Palliative Care Consult: Other (see comment)  Mode of Transport at Discharge: Self (pt's family will take her home at [place])  Transition of Care Consult (CM Consult): Discharge Planning  MyChart Signup: No  Discharge Durable Medical Equipment: No  Health Maintenance Reviewed: Yes  Physical Therapy Consult: No  Occupational Therapy Consult: No  Speech Therapy Consult: No  Current Support Network: Relative's Home  Confirm Follow Up Transport: Self  Plan discussed with Pt/Family/Caregiver: Yes  Discharge Location  Discharge Placement: Home     Patient 49years old female admitted to [facility] with syncope. Saw the patient in room with her multiple family members present at bedside. Patient AAO x 4, young, pleasant lady. She shares she lives with her brother, his wife, and her daughter in one story house. 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 Patient confirms she does not have medical insurance and will need assistance with medications at [place]. She will be provided with INDIGENT meds at [place]. DCP-home with family support; family will provide with transport at [place].  Peter Lopez rn, cm

## 2017-03-15 NOTE — PROGRESS NOTES
Completed 2D Echocardiogram. Report to follow. Patient to be transported back to room.     Roxanne Torres, JOSHUA, RDCS

## 2017-03-15 NOTE — PROGRESS NOTES
Intern Progress Note  [facility]       Patient: Samira West MRN: [id number]  CSN: [id number]    YOB: 1970  Age: 49 y.o. Sex: female    DOA: 3/14/2017 LOS:  LOS: 1 day                    Subjective:     Acute events: hypotension noted overnight to 82/57, responded well to 500cc NS bolus. Plan to consult cardiology this morning to establish care. This morning, patient reported that she was feeling much better, and noted that her cold symptoms had resolved. She denied chest pain, shortness of breath, n/v/d/c. Review of Systems:  Patient Endorses   ---------------------------------------------------------------------------------  Constitutional: Negative for fever, chills and diaphoresis. Respiratory: Negative for cough and hemoptysis. Cardiovascular: Negative for chest pain and palpitations. Objective:      Patient Vitals for the past 24 hrs:   Temp Pulse Resp BP SpO2   03/15/17 0400 98.5 °F (36.9 °C) 88 20 98/62 95 %   03/15/17 0030 - 75 - 97/62 -   03/14/17 2332 - (!) 103 - (!) 82/57 -   03/14/17 2331 - 95 - (!) 87/56 -   03/14/17 2330 98.4 °F (36.9 °C) 97 - (!) 86/53 -   03/14/17 2100 98.8 °F (37.1 °C) 85 14 105/63 97 %   03/14/17 1845 - 81 12 101/60 -   03/14/17 1815 - 81 19 104/57 -   03/14/17 1430 - 88 14 102/61 -   03/14/17 1415 - 88 16 100/58 -   03/14/17 1400 - 89 13 97/59 -   03/14/17 1345 - 88 15 99/57 -   03/14/17 1330 - 87 11 101/59 -   03/14/17 1315 - 88 13 105/64 -   03/14/17 1314 99.1 °F (37.3 °C) 88 11 95/55 94 %       No intake or output data in the 24 hours ending 03/15/17 0714    Physical Exam:   General: Alert and Responsive and in No acute distress. Primary language is Turkmen, but conversive in [place]. HEENT: Conjunctiva pink, sclera anicteric. EOMI. Pharynx moist, nonerythematous. Moist mucous membranes. Thyroid not enlarged, no nodules. RESP: Unlabored breathing. Lungs clear to auscultation. no wheeze, rales, or rhonchi.  Equal expansion bilaterally. ABD:  Soft, nondistended. Mild TTP in epigastric region. Mild TTP in LLQ, no rebound. Neuro:  5/5 strength bilateral upper extremities and lower extremities. A+Ox3. Ext: No edema. 2+ radial and dp pulses bilaterally. Lab/Data Reviewed:  CMP:   Lab Results   Component Value Date/Time     03/15/2017 03:00 AM    K 3.6 03/15/2017 03:00 AM     03/15/2017 03:00 AM    CO2 32 03/15/2017 03:00 AM    AGAP 5 03/15/2017 03:00 AM    GLU 83 03/15/2017 03:00 AM    BUN 9 03/15/2017 03:00 AM    CREA 0.73 03/15/2017 03:00 AM    GFRAA >60 03/15/2017 03:00 AM    GFRNA >60 03/15/2017 03:00 AM    CA 8.5 03/15/2017 03:00 AM    ALB 3.6 03/14/2017 02:35 PM    TP 7.3 03/14/2017 02:35 PM    GLOB 3.7 03/14/2017 02:35 PM    AGRAT 1.0 03/14/2017 02:35 PM    SGOT 16 03/14/2017 02:35 PM    ALT 24 03/14/2017 02:35 PM     CBC:   Lab Results   Component Value Date/Time    WBC 5.6 03/15/2017 03:00 AM    HGB 12.1 03/15/2017 03:00 AM    HCT 37.4 03/15/2017 03:00 AM     03/15/2017 03:00 AM        Scheduled Medications Reviewed:  Current Facility-Administered Medications   Medication Dose Route Frequency       Imaging, microbiology, and EKG/Telemetry:  EKG 3/15/2017  Normal sinus rhythm   Normal ECG   When compared with ECG of 14-MAR-2017 13:55,   T wave inversion no longer evident in Inferior leads     Assessment/Plan     49 y.o. female with PMH GERD and pertinent family history of sudden death at young age, now presenting with complaint of syncopal episode.     Syncopal Episode: DDx includes dehydration vs arhythmia vs orthostatic hypotension vs SSS. Cardiac enzymes negative in the ED. History concerning for episodes of chest pain becoming more frequent, and unexpected death in family member at young age. EKG read by ED as concerning for ST depressions In leads II, III, avF. No prior EKG for comparison. CXR with no acute cardiopulmonary findings. Seriel troponins negative x 3. Orthostatic vitals negative.   EKG NSR this morning, possible ST depression in aVF on my read, however minimal.  - Discussed patient with ANNE Kent.  Ordered nuclear stress. Made patient NPO now and patient has not eaten since last night. Per nuclear stress lab, patient will be able to be seen early this morning.   - Continue telemetry.      GERD:  No complaints during admission.  - Patient not taking prilosec as suggested by PCP.     Barrier to Care: Patient does not have health insurance.    - Will consult case management for assistance.     Diet: Redular  DVT Prophylaxis: SCDs  Code Status: FULL  Point of Contact: Cailin Mcdonald (Relationship: brother) [phone]    Linda Parsons MD  03/15/17    7:14 AM

## 2017-03-15 NOTE — PROGRESS NOTES
Patient was given 10.71 mCi of Sestamibi for the Resting pictures. Patient received 0.4 mg of Lexiscan for the exercise portion of the Stress test. Patient was then given 33 mCi of Sestamibi for the Stress pictures. Patient went back to room with armband still on.

## 2017-03-15 NOTE — CDMP QUERY
After further study, please specify probable cause of patient's Syncope    =>SSS  =>Hypotension, hypovolemia  =>Other Explanation of clinical findings  =>Unable to Determine (no explanation of clinical findings)    The medical record reflects the following clinical findings, treatment, and risk factors:    **Pt admitted with Syncope. Pt has had c/o Chest pain recently. EKG this am shows possible ST depression in a aVF, though minimal.   **Pt has hx of GERD  **Hypotensive episode this am requiring NS bolus. Please clarify and document your clinical opinion in the progress notes and discharge summary including the definitive and/or presumptive diagnosis, (suspected or probable), related to the above clinical findings. Please include clinical findings supporting your diagnosis. If you DECLINE this query or would like to communicate with [employer], please utilize the \"[employer] message box\" at the TOP of the Progress Note on the right. QUESTIONS?    Lyudmila Ferreira RN  [facility] [phone]

## 2017-03-15 NOTE — ROUTINE PROCESS
1913 Report rcvd from Ag Dubose RN(offgoing nurse) given to cSooby Cerda, MARICHUY(oncoming nurse). Report consist of report received from ER.     1920 Pt xferred to 3rt room 2 [facility] of low blood pressures of 86/56(97),87/56(95),82/57(103). New orders noted. [address] of blood pressure 97/62(75) s/p 500ml bolus of NS.    0545 EKG performed; Pt tolerated well. 0730  Bedside verbal shift report given to Dewey Gomez RN(oncoming nurse) by Scooby Cerda RN(offgoing nurse).  Report consist of SBAR,MAR,KARDEX,TELE,I/0

## 2017-03-15 NOTE — DISCHARGE SUMMARY
Discharge Summary  [facility]      Patient: Emigdio Kaur Age: 49 y.o. Sex: female  : 1970    MRN: [id number]      DOA: 3/14/2017      Discharge Date: 3/15/2017      Attending: Ra Bennett MD      PCP: Brian Park MD        ================================================================    Reason for Admission: Syncope  Syncope and collapse  Syncope  Syncope and collapse    Discharge Diagnoses:   Syncopal Episode  GERD  Barrier to Care: lack of health insurance    Important notes to PCP/ follow-up studies and evaluations   -Patient had normal echo with EF 60-56%. -Low risk findings on nuclear stress test.  - Patient mentioned epigastric pain episodes 2-3 times per week, discussed restarting PPI as prescribed in outpatient.  - TSH was 0.20, however FT4 was in normal range of 0.9. Pending labs and studies:  none  Operative Procedures:   none    Discharge Medications:     Discharge Medication List as of 3/15/2017  4:13 PM      Patient was not on any medications upon admission, and was discharged without any necessary daily medications. Disposition: Home    Consultants:    Cardiology    Brief Hospital Course (including pertinent history and physical findings)  On 3/14/2017, 49 y.o. female with PMH GERD and pertinent family history of sudden death at young age, now presenting with complaint of syncopal episode. She had mentioned that she experiences episodes of chest pain and shortness of breath every 1-2 weeks. She has a pertinent family history of sudden death, and MI in paternal grandfather. During admission, cardiology was consulted and performed Echo and stress test, results below. Overall low risk studies, however, cardiology wanted to discuss and consider further workup as an outpatient. She was cleared for discharge from cardiology standpoint. On day of discharge, patient stated that she felt 100% better. She denied cp, sob, n/v/d/c.   She was ambulating without assistance, voiding, and stooling appropriately. Case management saw patient during the admission and discussed options for payment and obtaining insurance. She was deemed stable for discharge and follow up was scheduled with cardiology    Summarized key findings and results (labs, imaging studies, ECHO, cardiac cath, endoscopies, etc):    CBC w/Diff    Lab Results   Component Value Date/Time    WBC 5.6 03/15/2017 03:00 AM    HGB 12.1 03/15/2017 03:00 AM    HCT 37.4 03/15/2017 03:00 AM    PLATELET 719 90/46/5006 03:00 AM    MCV 89.9 03/15/2017 03:00 AM           Chemistry    Lab Results   Component Value Date/Time    Sodium 142 03/15/2017 03:00 AM    Potassium 3.6 03/15/2017 03:00 AM    Chloride 105 03/15/2017 03:00 AM    CO2 32 03/15/2017 03:00 AM    Anion gap 5 03/15/2017 03:00 AM    Glucose 83 03/15/2017 03:00 AM    BUN 9 03/15/2017 03:00 AM    Creatinine 0.73 03/15/2017 03:00 AM    BUN/Creatinine ratio 12 03/15/2017 03:00 AM    GFR est AA >60 03/15/2017 03:00 AM    GFR est non-AA >60 03/15/2017 03:00 AM    Calcium 8.5 03/15/2017 03:00 AM         Stress Test 3/15/2017  CONCLUSIONS  1. Nondiagnostic pharmacologic stress test from an  electrocardiographic standpoint due to underlying inferolateral ST  depression noted at baseline. 2. Normal perfusion study. 3. Perfusion imaging shows no evidence of significant ongoing  ischemia or prior infarction. 4. Gated SPECT imaging shows small left ventricular chamber size  and hyperdynamic left ventricular function with estimated ejection  fraction greater than 70%. No obvious regional wall motion  abnormalities noted. 5. This is a low risk finding. Echo 3/15/2017  SUMMARY:  Left ventricle: Systolic function was normal by visual assessment. Ejection fraction was estimated in the range of 60 % to 65 %.  No obvious  wall motion abnormalities identified in the views obtained.     Functional status and cognitive function:    Ambulates without difficulty  Status: alert, cooperative, no distress, appears stated age    Diet:General Diet    Code status and advanced care plan: Full  Point of Contact: Kumar Richmond (Relationship: brother) [phone]    Patient Education:  Patient was educated on the following topics prior to discharge: ACS, importance of follow up    Follow-up:   Follow-up Information     Follow up With Details Comments Lexi Robertson MD  office will call patient if able to schedule appointment, if unable, office  will call  to find new PCP. [address] [place] [address]      Froylan Ely MD On 3/30/2017 at 8:20am [address]  [phone]              ================================================================  Ed Junior MD  03/16/17    10:24 AM    For Lella Fear:    Cause of snycope -  Unable to Determine (no explanation of clinical findings)

## 2017-03-15 NOTE — PROCEDURES
[address] [facility] CARDIAC STRESS    Name:  Toni Carty  MR#:  [id number]  :  1970  Account #:  [de-identified]  Date of Adm:  2017  Date of Service:  03/15/2017      PROCEDURES PERFORMED: Pharmacologic nuclear scan. BASELINE ELECTROCARDIOGRAM: Shows sinus rhythm with  nonspecific inferolateral ST depression. The patient has an IV in the left antecubital space. She received  Lexiscan per protocol. She tolerated the procedure well. No chest pain  was noted. She received resting dose of sestamibi 10.71 mCi at 8:20  a.m. She received stress dose of sestamibi 33.0 mCi at 9:55 a.m. TREADMILL FINDINGS  1. ST segment changes: None. 2. Chest pain: None. 3. Dysrhythmia: None. 4. Both heart rate and blood pressure response are normal.    TREADMILL CONCLUSION: This is a nondiagnostic pharmacologic  stress test from an electrocardiographic standpoint due to underlying  inferolateral ST depression noted at baseline. MYOCARDIAL NUCLEAR PERFUSION STUDY FINDINGS  1. Perfusion imaging shows no evidence of significant ongoing  ischemia or prior infarction. 2. Gated SPECT imaging shows small left ventricular chamber size  and hyperdynamic LV function with estimated ejection fraction greater  than 70%. No obvious regional wall motion abnormalities noted. CONCLUSIONS  1. Nondiagnostic pharmacologic stress test from an  electrocardiographic standpoint due to underlying inferolateral ST  depression noted at baseline. 2. Normal perfusion study. 3. Perfusion imaging shows no evidence of significant ongoing  ischemia or prior infarction. 4. Gated SPECT imaging shows small left ventricular chamber size  and hyperdynamic left ventricular function with estimated ejection  fraction greater than 70%. No obvious regional wall motion  abnormalities noted. 5. This is a low risk finding.         MD Raymond Stovall / Priscila  D:  03/15/2017   12:43  T:  03/15/2017   13:30  Job #: [id number]

## 2017-03-15 NOTE — DISCHARGE INSTRUCTIONS
DISCHARGE SUMMARY from Nurse    The following personal items are in your possession at time of discharge:    Dental Appliances: None  Visual Aid: None     Home Medications: None  Jewelry: None  Clothing: At bedside, Other (comment)  Other Valuables: At bedside, Cell Phone  Personal Items Sent to Safe:  (none)          PATIENT INSTRUCTIONS:    After general anesthesia or intravenous sedation, for 24 hours or while taking prescription Narcotics:  · Limit your activities  · Do not drive and operate hazardous machinery  · Do not make important personal or business decisions  · Do  not drink alcoholic beverages  · If you have not urinated within 8 hours after discharge, please contact your surgeon on call. Report the following to your surgeon:  · Excessive pain, swelling, redness or odor of or around the surgical area  · Temperature over 100.5  · Nausea and vomiting lasting longer than 4 hours or if unable to take medications  · Any signs of decreased circulation or nerve impairment to extremity: change in color, persistent  numbness, tingling, coldness or increase pain  · Any questions        What to do at Home:  Recommended activity: Activity as tolerated,     If you experience any of the following symptoms Chest Pain, Shortness of Breath, Recurrent Fainting, and  Fever please follow up with Emergency Department. *  Please give a list of your current medications to your Primary Care Provider. *  Please update this list whenever your medications are discontinued, doses are      changed, or new medications (including over-the-counter products) are added. *  Please carry medication information at all times in case of emergency situations. These are general instructions for a healthy lifestyle:    No smoking/ No tobacco products/ Avoid exposure to second hand smoke    Surgeon General's Warning:  Quitting smoking now greatly reduces serious risk to your health.     Obesity, smoking, and sedentary lifestyle greatly increases your risk for illness    A healthy diet, regular physical exercise & weight monitoring are important for maintaining a healthy lifestyle    You may be retaining fluid if you have a history of heart failure or if you experience any of the following symptoms:  Weight gain of 3 pounds or more overnight or 5 pounds in a week, increased swelling in our hands or feet or shortness of breath while lying flat in bed. Please call your doctor as soon as you notice any of these symptoms; do not wait until your next office visit. Recognize signs and symptoms of STROKE:    F-face looks uneven    A-arms unable to move or move unevenly    S-speech slurred or non-existent    T-time-call 911 as soon as signs and symptoms begin-DO NOT go       Back to bed or wait to see if you get better-TIME IS BRAIN. Warning Signs of HEART ATTACK     Call 911 if you have these symptoms:   Chest discomfort. Most heart attacks involve discomfort in the center of the chest that lasts more than a few minutes, or that goes away and comes back. It can feel like uncomfortable pressure, squeezing, fullness, or pain.  Discomfort in other areas of the upper body. Symptoms can include pain or discomfort in one or both arms, the back, neck, jaw, or stomach.  Shortness of breath with or without chest discomfort.  Other signs may include breaking out in a cold sweat, nausea, or lightheadedness. Don't wait more than five minutes to call 911 - MINUTES MATTER! Fast action can save your life. Calling 911 is almost always the fastest way to get lifesaving treatment. Emergency Medical Services staff can begin treatment when they arrive -- up to an hour sooner than if someone gets to the hospital by car. The discharge information has been reviewed with the patient and family. The patient and family verbalized understanding.     Discharge medications reviewed with the patient and family and appropriate educational materials and side effects teaching were provided. Patient armband removed and shredded     Desmayos: Instrucciones de cuidado - [ Fainting: Care Instructions ]  Instrucciones de cuidado    Cuando se desmaya, o pierde el conocimiento, usted está inconsciente por un corto tiempo. El desmayo suele ser causado por tayo breve disminución del flujo sanguíneo al cerebro. Al caer o quedar tendido, le fluye más nakia al cerebro y recupera el conocimiento. El estrés emocional, el dolor o el acaloramiento, sobre todo si rodriguez estado de pie, pueden hacer que se [place] 1. En estos casos, el desmayo no suele ser grave. Hermilo el Dodson Sachs puede ser tayo señal de un problema más grave. Quizás bower médico quiera hacerle más pruebas para descartar otras causas. El tratamiento que requiera depende de la causa del Dodson Sachs. El médico lo rodriguez examinado minuciosamente, hermilo pueden presentarse problemas más tarde. Si nota algún problema o nuevos síntomas, busque tratamiento médico de inmediato. La atención de seguimiento es tayo parte clave de bower tratamiento y seguridad. Asegúrese de hacer y acudir a todas las citas, y llame a bower médico si está teniendo problemas. También es tayo buena idea saber los resultados de graeme exámenes y mantener tayo lista de los medicamentos que juliette. ¿Cómo puede cuidarse en el hogar? · Stephanie líquidos en abundancia para prevenir la deshidratación. Si tiene tayo enfermedad renal, cardíaca o hepática y tiene que restringir los líquidos, hable con bower médico antes de aumentar bower consumo de líquidos. ¿Cuándo debe pedir ayuda? Llame al 911 en cualquier momento que piense que puede necesitar atención de [place]. Por ejemplo, llame si:  · Tiene síntomas de un problema del corazón. Estos pueden incluir:  ¨ Dolor o presión en el pecho. ¨ Graves dificultades para respirar. ¨ Latidos cardíacos rápidos o irregulares. ¨ Aturdimiento o debilidad repentina.   ¨ Toser mucosidad espumosa y de color Fredie Sonia Desmayos. Cuando llame al 911, quizás la operadora le diga que mastique 1 aspirina para adultos o de 2 a 4 aspirinas de dosis baja. Espere a la ambulancia. No trate de conducir usted mismo. · Tiene síntomas de un ataque cerebral. Estos pueden incluir:  ¨ Entumecimiento, hormigueo, debilitamiento o pérdida de movimiento repentinos en la guanako, el brazo o la pierna, sobre todo en un solo lado del cuerpo. ¨ Cambios repentinos en la visión. ¨ Dificultades repentinas para hablar. ¨ Confusión repentina o súbita dificultad para comprender frases sencillas. ¨ Problemas repentinos para caminar o mantener el equilibrio. ¨ Dolor de [place] intenso y repentino, distinto de los jenni de hudson anteriores. · Se desmayó (perdió el conocimiento) otra vez. Vigile de cerca los cambios en bower lionel y asegúrese de comunicarse con bower médico si:  · No mejora shannan se esperaba. ¿Dónde puede encontrar más información en inglés? Kobe Imam a http://jeannine-calderon.info/. Xiomy Hyde I469 en la búsqueda para aprender más acerca de \"Desmayos: Instrucciones de cuidado - [ Fainting: Care Instructions ]. \"  Revisado: [address] [place], 2016  Versión del contenido: 11.1  © 7221-9849 Healthwise, Incorporated. Las instrucciones de cuidado fueron adaptadas bajo licencia por [employer] (which disclaims liability or warranty for this information). Si usted tiene [place] afección médica o sobre estas instrucciones, siempre pregunte a bower profesional de lionel. Healthwise, Incorporated niega toda garantía o responsabilidad por bower uso de esta información. Aturdimiento o desmayo: Instrucciones de cuidado - [ Sharyon Broody or Faintness: Care Instructions ]  Instrucciones de cuidado  El aturdimiento es la sensación de que está a punto de desmayarse o de \"perder el conocimiento\". No se siente shannan si usted o lo que le rodea se [place].  Es distinto del vértigo, que es la sensación de que usted o las cosas que le rodean dan vueltas o se inclinan. El aturdimiento suele desaparecer o mejorar cuando se acuesta. Si el aturdimiento empeora, esto puede conducir a un desvanecimiento. Es común sentirse aturdido de [employer]. Por lo general, el aturdimiento no se debe a un problema grave. A menudo es causado por tayo disminución de corta duración de la presión arterial y el flujo de nakia hacia la hudson que se produce al ponerse de pie con demasiada rapidez cuando está acostado o sentado. La atención de seguimiento es tayo parte clave de bower tratamiento y seguridad. Asegúrese de hacer y acudir a todas las citas, y llame a bower médico si está teniendo problemas. También es tayo buena idea saber los resultados de graeme exámenes y mantener tayo lista de los medicamentos que juliette. ¿Cómo puede cuidarse en el hogar? · Acuéstese efraín 1 o 2 minutos cuando se sienta aturdido. Después de acostarse, siéntese lentamente y permanezca sentado de 1 a 2 minutos antes de ponerse de pie lentamente. · Evite los movimientos, las posturas o las actividades que le hayan producido aturdimiento en el pasado. · Descanse mucho, en especial si está resfriado o tiene gripe, ya que estas pueden causar aturdimiento. · Asegúrese de beber abundante líquido, en especial si tiene fiebre o si ha estado sudando. · No conduzca ni se ponga a sí mismo o ponga a otros en peligro mientras se sienta aturdido. ¿Cuándo debe pedir ayuda? Llame al 911 en cualquier momento que considere que necesita atención de [place]. Por ejemplo, llame si:  · Tiene síntomas de un ataque cerebral. Estos pueden incluir:  ¨ Entumecimiento, hormigueo, debilidad o parálisis repentinos en la guanako, el brazo o la pierna, sobre todo si ocurre en un solo lado del cuerpo. ¨ Cambios súbitos en la vista. ¨ Problemas repentinos para hablar. ¨ Confusión súbita o dificultad repentina para comprender frases sencillas. ¨ Problemas repentinos para caminar o mantener el equilibrio.   ¨ Un dolor de hudson intenso y repentino, distinto a los jenni de hudson anteriores. · Tiene síntomas de un ataque al corazón. Estos podrían incluir:  ¨ Dolor o presión en el pecho, o tayo sensación extraña en el pecho. ¨ Sudoración. ¨ Falta de aire. ¨ Náuseas o vómito. ¨ Dolor, presión o tayo sensación extraña en la espalda, el martina, la mandíbula, la parte superior del abdomen, o en ryan o ambos hombros o brazos. ¨ Aturdimiento o debilidad repentina. ¨ Latidos cardíacos rápidos o irregulares. Cuando llame al 911, es posible que le digan que mastique 1 aspirina para adultos o 2 a 4 aspirinas de dosis baja. Espere a la ambulancia. No trate de conducir usted mismo un automóvil. Preste especial atención a los cambios en bower lionel y asegúrese de comunicarse con bower médico si:  · El aturdimiento Kev Patee o no mejora con los cuidados en el hogar. ¿Dónde puede encontrar más información en inglés? Freddy Rosales a http://jeannine-calderon.info/. Venus Watts F275 en la búsqueda para aprender más acerca de \"Aturdimiento o desmayo: Instrucciones de cuidado - [ Srin[place] or Faintness: Care Instructions ]. \"  Revisado: [address] [place], 2016  Versión del contenido: 11.1  © 9684-7667 Healthwise, Incorporated. Las instrucciones de cuidado fueron adaptadas bajo licencia por [employer] (which disclaims liability or warranty for this information). Si usted tiene [place] afección médica o sobre estas instrucciones, siempre pregunte a bower profesional de lionel. Healthwise, Incorporated niega toda garantía o responsabilidad por bower uso de esta información.

## 2017-03-15 NOTE — INTERDISCIPLINARY ROUNDS
IDR/SLIDR Summary          Patient: Samira West MRN: [id number]    Age: 49 y.o. YOB: 1970 Room/Bed: [facility]/   Admit Diagnosis: Syncope  Syncope and collapse  Syncope  Principal Diagnosis: Syncope and collapse   Goals: Home feel better  Readmission: NO  Quality Measure: Not applicable  VTE Prophylaxis: Mechanical SCD  Influenza Vaccine screening completed? YES  Pneumococcal Vaccine screening completed? NO  Mobility needs: No   Nutrition plan:No  Consults:Case Management    Financial concerns:No  Escalated to ? YES  RRAT Score:    Interventions:Home Health  Testing due for pt today?  YES Echo Nuc stress test done  LOS: 1 days Expected length of stay 2 days  Discharge plan: Home   PCP: Noemy Masterson MD  Transportation needs: No    Days before discharge:one day until discharge   Discharge disposition: Home    Signed:     Beatris Felix RN  3/15/2017  11:11 AM

## 2017-03-15 NOTE — PROGRESS NOTES
Patient is not available to be assessed at this time.     [facility] Certified [address] [place] [address]   [phone]

## 2017-03-15 NOTE — PROGRESS NOTES
*ATTENTION:  This note has been created by a medical student for educational purposes only. Please do not refer to the content of this note for clinical decision-making, billing, or other purposes. Please see attending physicians note to obtain clinical information on this patient. *        Progress Note    Patient: Marina Santiago MRN: [id number]  SSN: [id number]    YOB: 1970  Age: 49 y.o. Sex: female      Admit Date: 3/14/2017    LOS: 1 day     Subjective:   Patient reports doing well overnight with the exception of one episode where her blood pressure dropped to 82/57 upon standing. She otherwise has had no further episodes of dizziness, neurological changes, or pain overnight. Objective:     Vitals:    03/14/17 2332 03/15/17 0030 03/15/17 0400 03/15/17 0410   BP: (!) 82/57 97/62 98/62    Pulse: (!) 103 75 88    Resp:   20    Temp:   98.5 °F (36.9 °C)    SpO2:   95%    Weight:    63.5 kg (140 lb)   Height:    5' 4\" (1.626 m)        Intake and Output:  Current Shift:    Last three shifts:      Physical Exam:   Gen: Normal, well appearing. In no acute distress  Card: Normal S1 and S2 on auscultation. No murmurs, gallops, or rubs appreciated  Resp: Normal breath sounds appreciated bilaterally. No crackles, rubs, or rhonchi  Abd: Soft, non-tender to palpation. Normal bowel sounds appreciated.  No rebound or guarding    Lab/Data Review:  Recent Results (from the past 24 hour(s))   EKG, 12 LEAD, INITIAL    Collection Time: 03/14/17  1:55 PM   Result Value Ref Range    Ventricular Rate 90 BPM    Atrial Rate 90 BPM    P-R Interval 152 ms    QRS Duration 82 ms    Q-T Interval 352 ms    QTC Calculation (Bezet) 430 ms    Calculated P Axis 69 degrees    Calculated R Axis 19 degrees    Calculated T Axis 8 degrees    Diagnosis       Normal sinus rhythm  Possible Left atrial enlargement  Nonspecific ST and T wave abnormality  Abnormal ECG  No previous ECGs available  Confirmed by Ayaka Armenta MD, Jesse Chan ([id number]) on 3/14/2017 3:20:33 PM     CBC WITH AUTOMATED DIFF    Collection Time: 03/14/17  2:35 PM   Result Value Ref Range    WBC 7.3 4.6 - 13.2 K/uL    RBC 4.29 4. 20 - 5.30 M/uL    HGB 12.6 12.0 - 16.0 g/dL    HCT 37.4 35.0 - 45.0 %    MCV 87.2 74.0 - 97.0 FL    MCH 29.4 24.0 - 34.0 PG    MCHC 33.7 31.0 - 37.0 g/dL    RDW 12.9 11.6 - 14.5 %    PLATELET 060 859 - 900 K/uL    MPV 10.1 9.2 - 11.8 FL    NEUTROPHILS 77 (H) 40 - 73 %    LYMPHOCYTES 13 (L) 21 - 52 %    MONOCYTES 10 3 - 10 %    EOSINOPHILS 0 0 - 5 %    BASOPHILS 0 0 - 2 %    ABS. NEUTROPHILS 5.6 1.8 - 8.0 K/UL    ABS. LYMPHOCYTES 1.0 0.9 - 3.6 K/UL    ABS. MONOCYTES 0.7 0.05 - 1.2 K/UL    ABS. EOSINOPHILS 0.0 0.0 - 0.4 K/UL    ABS. BASOPHILS 0.0 0.0 - 0.1 K/UL    DF AUTOMATED     METABOLIC PANEL, COMPREHENSIVE    Collection Time: 03/14/17  2:35 PM   Result Value Ref Range    Sodium 139 136 - 145 mmol/L    Potassium 3.7 3.5 - 5.5 mmol/L    Chloride 105 100 - 108 mmol/L    CO2 29 21 - 32 mmol/L    Anion gap 5 3.0 - 18 mmol/L    Glucose 97 74 - 99 mg/dL    BUN 10 7.0 - 18 MG/DL    Creatinine 0.74 0.6 - 1.3 MG/DL    BUN/Creatinine ratio 14 12 - 20      GFR est AA >60 >60 ml/min/1.73m2    GFR est non-AA >60 >60 ml/min/1.73m2    Calcium 8.6 8.5 - 10.1 MG/DL    Bilirubin, total 0.3 0.2 - 1.0 MG/DL    ALT (SGPT) 24 13 - 56 U/L    AST (SGOT) 16 15 - 37 U/L    Alk.  phosphatase 69 45 - 117 U/L    Protein, total 7.3 6.4 - 8.2 g/dL    Albumin 3.6 3.4 - 5.0 g/dL    Globulin 3.7 2.0 - 4.0 g/dL    A-G Ratio 1.0 0.8 - 1.7     CARDIAC PANEL,(CK, CKMB & TROPONIN)    Collection Time: 03/14/17  2:35 PM   Result Value Ref Range    CK 35 26 - 192 U/L    CK - MB <1.0 <3.6 ng/ml    CK-MB Index Cannot be calulated 0.0 - 4.0 %    Troponin-I, Qt. <0.02 0.0 - 0.045 NG/ML   LIPID PANEL    Collection Time: 03/14/17  2:35 PM   Result Value Ref Range    LIPID PROFILE          Cholesterol, total 180 <200 MG/DL    Triglyceride 99 <150 MG/DL    HDL Cholesterol 34 (L) 40 - 60 MG/DL    LDL, calculated 126.2 (H) 0 - 100 MG/DL    VLDL, calculated 19.8 MG/DL    CHOL/HDL Ratio 5.3 (H) 0 - 5.0     HCG URINE, QL    Collection Time: 03/14/17  2:55 PM   Result Value Ref Range    HCG urine, Ql. NEGATIVE  NEG     CARDIAC PANEL,(CK, CKMB & TROPONIN)    Collection Time: 03/14/17  9:50 PM   Result Value Ref Range    CK 35 26 - 192 U/L    CK - MB <1.0 <3.6 ng/ml    CK-MB Index Cannot be calulated 0.0 - 4.0 %    Troponin-I, Qt. <0.02 0.0 - 0.045 NG/ML   CARDIAC PANEL,(CK, CKMB & TROPONIN)    Collection Time: 03/15/17  3:00 AM   Result Value Ref Range    CK 48 26 - 192 U/L    CK - MB <1.0 <3.6 ng/ml    CK-MB Index Cannot be calulated 0.0 - 4.0 %    Troponin-I, Qt. <0.02 0.0 - 0.045 NG/ML   CBC WITH AUTOMATED DIFF    Collection Time: 03/15/17  3:00 AM   Result Value Ref Range    WBC 5.6 4.6 - 13.2 K/uL    RBC 4.16 (L) 4.20 - 5.30 M/uL    HGB 12.1 12.0 - 16.0 g/dL    HCT 37.4 35.0 - 45.0 %    MCV 89.9 74.0 - 97.0 FL    MCH 29.1 24.0 - 34.0 PG    MCHC 32.4 31.0 - 37.0 g/dL    RDW 13.1 11.6 - 14.5 %    PLATELET 624 275 - 605 K/uL    MPV 10.7 9.2 - 11.8 FL    NEUTROPHILS 39 (L) 40 - 73 %    LYMPHOCYTES 47 21 - 52 %    MONOCYTES 13 (H) 3 - 10 %    EOSINOPHILS 0 0 - 5 %    BASOPHILS 1 0 - 2 %    ABS. NEUTROPHILS 2.2 1.8 - 8.0 K/UL    ABS. LYMPHOCYTES 2.7 0.9 - 3.6 K/UL    ABS. MONOCYTES 0.8 0.05 - 1.2 K/UL    ABS. EOSINOPHILS 0.0 0.0 - 0.4 K/UL    ABS.  BASOPHILS 0.0 0.0 - 0.1 K/UL    DF AUTOMATED     METABOLIC PANEL, BASIC    Collection Time: 03/15/17  3:00 AM   Result Value Ref Range    Sodium 142 136 - 145 mmol/L    Potassium 3.6 3.5 - 5.5 mmol/L    Chloride 105 100 - 108 mmol/L    CO2 32 21 - 32 mmol/L    Anion gap 5 3.0 - 18 mmol/L    Glucose 83 74 - 99 mg/dL    BUN 9 7.0 - 18 MG/DL    Creatinine 0.73 0.6 - 1.3 MG/DL    BUN/Creatinine ratio 12 12 - 20      GFR est AA >60 >60 ml/min/1.73m2    GFR est non-AA >60 >60 ml/min/1.73m2    Calcium 8.5 8.5 - 10.1 MG/DL   EKG, 12 LEAD, SUBSEQUENT    Collection Time: 03/15/17  5:44 AM   Result Value Ref Range    Ventricular Rate 76 BPM    Atrial Rate 76 BPM    P-R Interval 164 ms    QRS Duration 84 ms    Q-T Interval 354 ms    QTC Calculation (Bezet) 398 ms    Calculated P Axis 80 degrees    Calculated R Axis 68 degrees    Calculated T Axis 49 degrees    Diagnosis       Normal sinus rhythm  Normal ECG  When compared with ECG of 14-MAR-2017 13:55,  T wave inversion no longer evident in Inferior leads         Assessment:     Principal Problem:    Syncope and collapse (3/14/2017)    Active Problems:    Syncope (3/14/2017)        Plan:     1. Syncopal episode--Considering her recent illness that preceded this syncopal episode and the fact that she was unable to eat throughout the entire past day, it seems most likely that this episode was related to a combination of hypotension and possibly hypoglycemia. I would schedule her for an echo and [facility]. If stress test is negative, she may be discharged. Additionally, cardiology will be consulted for further workup in the outpatient setting in order to rule out the possibility of cardiac etiology.      Signed By: Russ Figueroa     March 15, 2017
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
## 2017-03-16 LAB
ATRIAL RATE: 76 BPM
ATTENDING PHYSICIAN, CST07: NORMAL
CALCULATED P AXIS, ECG09: 80 DEGREES
CALCULATED R AXIS, ECG10: 68 DEGREES
CALCULATED T AXIS, ECG11: 49 DEGREES
DIAGNOSIS, 93000: NORMAL
DIAGNOSIS, 93000: NORMAL
DUKE TM SCORE RESULT, CST14: NORMAL
DUKE TREADMILL SCORE, CST13: NORMAL
ECG INTERP BEFORE EX, CST11: NORMAL
ECG INTERP DURING EX, CST12: NORMAL
FUNCTIONAL CAPACITY, CST17: NORMAL
KNOWN CARDIAC CONDITION, CST08: NORMAL
MAX. DIASTOLIC BP, CST04: 40 MMHG
MAX. HEART RATE, CST05: 150 BPM
MAX. SYSTOLIC BP, CST03: 130 MMHG
OVERALL BP RESPONSE TO EXERCISE, CST16: NORMAL
OVERALL HR RESPONSE TO EXERCISE, CST15: NORMAL
P-R INTERVAL, ECG05: 164 MS
PEAK EX METS, CST10: 1.6 METS
PROTOCOL NAME, CST01: NORMAL
Q-T INTERVAL, ECG07: 354 MS
QRS DURATION, ECG06: 84 MS
QTC CALCULATION (BEZET), ECG08: 398 MS
TEST INDICATION, CST09: NORMAL
VENTRICULAR RATE, ECG03: 76 BPM

## 2017-03-17 ENCOUNTER — APPOINTMENT (OUTPATIENT)
Dept: GENERAL RADIOLOGY | Age: 47
End: 2017-03-17
Attending: EMERGENCY MEDICINE
Payer: SUBSIDIZED

## 2017-03-17 ENCOUNTER — HOSPITAL ENCOUNTER (EMERGENCY)
Age: 47
Discharge: HOME | End: 2017-03-17
Attending: EMERGENCY MEDICINE
Payer: SUBSIDIZED

## 2017-03-17 VITALS
WEIGHT: 140 LBS | TEMPERATURE: 98.5 F | DIASTOLIC BLOOD PRESSURE: 64 MMHG | BODY MASS INDEX: 23.9 KG/M2 | HEIGHT: 64 IN | OXYGEN SATURATION: 100 % | RESPIRATION RATE: 13 BRPM | HEART RATE: 71 BPM | SYSTOLIC BLOOD PRESSURE: 99 MMHG

## 2017-03-17 DIAGNOSIS — M79.10 MYALGIA: ICD-10-CM

## 2017-03-17 DIAGNOSIS — R42 LIGHTHEADEDNESS: Primary | ICD-10-CM

## 2017-03-17 LAB
ALBUMIN SERPL BCP-MCNC: 3.8 G/DL (ref 3.4–5)
ALBUMIN/GLOB SERPL: 1 {RATIO} (ref 0.8–1.7)
ALP SERPL-CCNC: 72 U/L (ref 45–117)
ALT SERPL-CCNC: 23 U/L (ref 13–56)
AMPHET UR QL SCN: NEGATIVE
ANION GAP BLD CALC-SCNC: 5 MMOL/L (ref 3–18)
APPEARANCE UR: CLEAR
AST SERPL W P-5'-P-CCNC: 18 U/L (ref 15–37)
ATRIAL RATE: 74 BPM
BACTERIA URNS QL MICRO: ABNORMAL /HPF
BARBITURATES UR QL SCN: NEGATIVE
BASOPHILS # BLD AUTO: 0 K/UL (ref 0–0.06)
BASOPHILS # BLD: 1 % (ref 0–2)
BENZODIAZ UR QL: NEGATIVE
BILIRUB SERPL-MCNC: 0.4 MG/DL (ref 0.2–1)
BILIRUB UR QL: NEGATIVE
BUN SERPL-MCNC: 12 MG/DL (ref 7–18)
BUN/CREAT SERPL: 13 (ref 12–20)
CALCIUM SERPL-MCNC: 8.7 MG/DL (ref 8.5–10.1)
CALCULATED P AXIS, ECG09: 73 DEGREES
CALCULATED R AXIS, ECG10: 24 DEGREES
CALCULATED T AXIS, ECG11: 6 DEGREES
CANNABINOIDS UR QL SCN: NEGATIVE
CHLORIDE SERPL-SCNC: 102 MMOL/L (ref 100–108)
CK MB CFR SERPL CALC: NORMAL % (ref 0–4)
CK MB SERPL-MCNC: <1 NG/ML (ref 5–25)
CK SERPL-CCNC: 39 U/L (ref 26–192)
CO2 SERPL-SCNC: 32 MMOL/L (ref 21–32)
COCAINE UR QL SCN: NEGATIVE
COLOR UR: YELLOW
CREAT SERPL-MCNC: 0.94 MG/DL (ref 0.6–1.3)
DIAGNOSIS, 93000: NORMAL
DIFFERENTIAL METHOD BLD: NORMAL
EOSINOPHIL # BLD: 0 K/UL (ref 0–0.4)
EOSINOPHIL NFR BLD: 0 % (ref 0–5)
EPITH CASTS URNS QL MICRO: ABNORMAL /LPF (ref 0–5)
ERYTHROCYTE [DISTWIDTH] IN BLOOD BY AUTOMATED COUNT: 12.8 % (ref 11.6–14.5)
FLUAV AG NPH QL IA: NEGATIVE
FLUBV AG NOSE QL IA: NEGATIVE
GLOBULIN SER CALC-MCNC: 3.8 G/DL (ref 2–4)
GLUCOSE SERPL-MCNC: 119 MG/DL (ref 74–99)
GLUCOSE UR STRIP.AUTO-MCNC: NEGATIVE MG/DL
HCG UR QL: NEGATIVE
HCT VFR BLD AUTO: 40.7 % (ref 35–45)
HDSCOM,HDSCOM: NORMAL
HGB BLD-MCNC: 13.2 G/DL (ref 12–16)
HGB UR QL STRIP: NEGATIVE
KETONES UR QL STRIP.AUTO: ABNORMAL MG/DL
LEUKOCYTE ESTERASE UR QL STRIP.AUTO: ABNORMAL
LYMPHOCYTES # BLD AUTO: 47 % (ref 21–52)
LYMPHOCYTES # BLD: 2.2 K/UL (ref 0.9–3.6)
MAGNESIUM SERPL-MCNC: 2.3 MG/DL (ref 1.8–2.4)
MCH RBC QN AUTO: 28.8 PG (ref 24–34)
MCHC RBC AUTO-ENTMCNC: 32.4 G/DL (ref 31–37)
MCV RBC AUTO: 88.9 FL (ref 74–97)
METHADONE UR QL: NEGATIVE
MONOCYTES # BLD: 0.4 K/UL (ref 0.05–1.2)
MONOCYTES NFR BLD AUTO: 8 % (ref 3–10)
MUCOUS THREADS URNS QL MICRO: ABNORMAL /LPF
NEUTS SEG # BLD: 2 K/UL (ref 1.8–8)
NEUTS SEG NFR BLD AUTO: 44 % (ref 40–73)
NITRITE UR QL STRIP.AUTO: NEGATIVE
OPIATES UR QL: NEGATIVE
P-R INTERVAL, ECG05: 150 MS
PCP UR QL: NEGATIVE
PH UR STRIP: 6.5 [PH] (ref 5–8)
PLATELET # BLD AUTO: 204 K/UL (ref 135–420)
PMV BLD AUTO: 10 FL (ref 9.2–11.8)
POTASSIUM SERPL-SCNC: 3.9 MMOL/L (ref 3.5–5.5)
PROT SERPL-MCNC: 7.6 G/DL (ref 6.4–8.2)
PROT UR STRIP-MCNC: 30 MG/DL
Q-T INTERVAL, ECG07: 356 MS
QRS DURATION, ECG06: 82 MS
QTC CALCULATION (BEZET), ECG08: 395 MS
RBC # BLD AUTO: 4.58 M/UL (ref 4.2–5.3)
RBC #/AREA URNS HPF: ABNORMAL /HPF (ref 0–5)
SODIUM SERPL-SCNC: 139 MMOL/L (ref 136–145)
SP GR UR REFRACTOMETRY: 1.03 (ref 1–1.03)
TROPONIN I SERPL-MCNC: <0.02 NG/ML (ref 0–0.04)
UROBILINOGEN UR QL STRIP.AUTO: 0.2 EU/DL (ref 0.2–1)
VENTRICULAR RATE, ECG03: 74 BPM
WBC # BLD AUTO: 4.6 K/UL (ref 4.6–13.2)
WBC URNS QL MICRO: ABNORMAL /HPF (ref 0–4)

## 2017-03-17 PROCEDURE — 80307 DRUG TEST PRSMV CHEM ANLYZR: CPT | Performed by: EMERGENCY MEDICINE

## 2017-03-17 PROCEDURE — 85025 COMPLETE CBC W/AUTO DIFF WBC: CPT | Performed by: EMERGENCY MEDICINE

## 2017-03-17 PROCEDURE — 74011250636 HC RX REV CODE- 250/636: Performed by: EMERGENCY MEDICINE

## 2017-03-17 PROCEDURE — 80053 COMPREHEN METABOLIC PANEL: CPT | Performed by: EMERGENCY MEDICINE

## 2017-03-17 PROCEDURE — 93005 ELECTROCARDIOGRAM TRACING: CPT

## 2017-03-17 PROCEDURE — 96374 THER/PROPH/DIAG INJ IV PUSH: CPT

## 2017-03-17 PROCEDURE — 99285 EMERGENCY DEPT VISIT HI MDM: CPT

## 2017-03-17 PROCEDURE — 87804 INFLUENZA ASSAY W/OPTIC: CPT | Performed by: EMERGENCY MEDICINE

## 2017-03-17 PROCEDURE — 81025 URINE PREGNANCY TEST: CPT | Performed by: EMERGENCY MEDICINE

## 2017-03-17 PROCEDURE — 81001 URINALYSIS AUTO W/SCOPE: CPT | Performed by: EMERGENCY MEDICINE

## 2017-03-17 PROCEDURE — 96361 HYDRATE IV INFUSION ADD-ON: CPT

## 2017-03-17 PROCEDURE — 82550 ASSAY OF CK (CPK): CPT | Performed by: EMERGENCY MEDICINE

## 2017-03-17 PROCEDURE — 83735 ASSAY OF MAGNESIUM: CPT | Performed by: EMERGENCY MEDICINE

## 2017-03-17 PROCEDURE — 71010 XR CHEST PORT: CPT

## 2017-03-17 RX ORDER — KETOROLAC TROMETHAMINE 30 MG/ML
30 INJECTION, SOLUTION INTRAMUSCULAR; INTRAVENOUS
Status: COMPLETED | OUTPATIENT
Start: 2017-03-17 | End: 2017-03-17

## 2017-03-17 RX ADMIN — KETOROLAC TROMETHAMINE 30 MG: 30 INJECTION, SOLUTION INTRAMUSCULAR at 12:17

## 2017-03-17 RX ADMIN — SODIUM CHLORIDE 1000 ML: 900 INJECTION, SOLUTION INTRAVENOUS at 11:23

## 2017-03-17 NOTE — ED TRIAGE NOTES
Pt,  States  She had feeling she  She's going to pass out,  dizzy Started an hour ago, pain back   Of her head,   Was admitted Tuesday for syncope.  Denies chest pain  Or shortness os breath

## 2017-03-17 NOTE — ED PROVIDER NOTES
HPI Comments: 11:28 AM Pearl Krause is a 49 y.o. female with no pertinent medical history who presents to the ED for multiple sxs including lightheadedness body aches, subjective fever, and mild cough. Patient states that she was here for syncope a few days ago and was discharged. She has had generalized body aches and subjective fever since, but this morning has been feeling lightheaded. Denies vertigo or actually passing out. Denies change in symptoms with standing. Pt has been taking ibuprofen for the pain with mild relief. Pt has no N/V/D, numbness, recurrent syncopal episodes, or weakness. Pt was recently admitted for a syncopal episode by Dr. Coelman Porras, had stress test performed, all results normal. Pt has appointment with PCP the 24th to follow up. No other acute concerns. PCP: Mei Vásquez MD      The history is provided by the patient. No past medical history on file. Past Surgical History:   Procedure Laterality Date    HX HYSTERECTOMY           Family History:   Problem Relation Age of Onset    Breast Cancer Paternal Aunt     Ovarian Cancer Paternal Grandmother        Social History     Social History    Marital status:      Spouse name: N/A    Number of children: N/A    Years of education: N/A     Occupational History    Not on file. Social History Main Topics    Smoking status: Never Smoker    Smokeless tobacco: Not on file    Alcohol use No    Drug use: No    Sexual activity: Not on file     Other Topics Concern    Not on file     Social History Narrative    No narrative on file         ALLERGIES: Review of patient's allergies indicates no known allergies. Review of Systems   Constitutional: Positive for fever. Negative for appetite change and chills. HENT: Negative. Negative for congestion, sore throat and trouble swallowing. Eyes: Negative. Negative for visual disturbance. Respiratory: Positive for cough. Negative for shortness of breath and wheezing. Cardiovascular: Negative. Negative for chest pain, palpitations and leg swelling. Gastrointestinal: Negative. Negative for abdominal pain, blood in stool, diarrhea and vomiting. Genitourinary: Negative. Negative for difficulty urinating, dysuria, frequency and vaginal discharge. Musculoskeletal: Negative. Negative for back pain and myalgias. (+) body aches   Skin: Negative. Negative for rash and wound. Neurological: Positive for light-headedness. Negative for dizziness, syncope, speech difficulty, weakness, numbness and headaches. Psychiatric/Behavioral: Negative. Negative for hallucinations, self-injury and suicidal ideas. All other systems reviewed and are negative. Vitals:    03/17/17 1136 03/17/17 1145 03/17/17 1201 03/17/17 1230   BP: 104/66 97/62 110/66 99/64   Pulse: 84 73  71   Resp: 19 14  13   Temp:       SpO2: 99% 99%  100%   Weight:       Height:                Physical Exam   Constitutional: She is oriented to person, place, and time. She appears well-developed and well-nourished. No distress. HENT:   Head: Normocephalic and atraumatic. Mouth/Throat: Oropharynx is clear and moist.   Eyes: Conjunctivae and EOM are normal. Pupils are equal, round, and reactive to light. No scleral icterus. Neck: Trachea normal and normal range of motion. Neck supple. No JVD present. No thyromegaly present. Cardiovascular: Normal rate, regular rhythm, S1 normal and S2 normal.  Exam reveals no gallop and no friction rub. No murmur heard. Pulmonary/Chest: Effort normal and breath sounds normal. No accessory muscle usage. No respiratory distress. Abdominal: Soft. Normal appearance. She exhibits no distension. There is no tenderness. There is no rigidity, no rebound and no guarding. Musculoskeletal: Normal range of motion. She exhibits no edema or tenderness. Neurological: She is alert and oriented to person, place, and time. She has normal strength.  No cranial nerve deficit or sensory deficit. Coordination normal.   Skin: Skin is warm and intact. No rash noted. Psychiatric: She has a normal mood and affect. Her speech is normal and behavior is normal.   Vitals reviewed. MDM  Number of Diagnoses or Management Options  Lightheadedness:   Myalgia:   Diagnosis management comments: Tahir Kramer is a 49 y.o. Female coming in with lightheadedness and myalgias. Recently admitted after a syncopal event from rest and had normal stress test and echo. Ruled out for MI. Has borderline low BP, but orthostatics negative and BP always seems to be marginal. W/u unremarkable. Likely a viral syndrome. No concerns for sepsis or cardiac etiology of symptoms. Will d/c home to follow up with PCP. ED Course       Procedures  Vitals:  Patient Vitals for the past 12 hrs:   Temp Pulse Resp BP SpO2   03/17/17 1230 - 71 13 99/64 100 %   03/17/17 1201 - - - 110/66 -   03/17/17 1145 - 73 14 97/62 99 %   03/17/17 1136 - 84 19 104/66 99 %   03/17/17 1135 - 78 16 102/68 100 %   03/17/17 1134 - 68 14 96/57 98 %   03/17/17 1130 - 73 14 110/46 99 %   03/17/17 1044 98.5 °F (36.9 °C) 72 16 98/73 99 %         EKG interpretation by ED Physician: Sinus rhythm at a rate of 74 bpm. ST and T wave changes in inferior and lateral leads, unchanged from previous EKG. X-Ray, CT or other radiology findings or impressions:  XR CHEST PORT   Final Result          Progress Notes: Remains resting comfortably in bed. Able to stand and ambulate. Will d/c home and gave strict return precautions for recurrent syncope or worsening symptoms. Disposition: DC    Diagnosis:   1. Lightheadedness    2. Myalgia          Scribe Attestation:     Miguel Villanueva, scribing for and in the presence of Imani Lagos MD March 17, 2017     Signed by:  Hany Bermudez, March 17, 2017 at 1:01 PM     Physician Attestation:   I personally performed the services described in this documentation, reviewed and edited the documentation which was dictated to the scribe in my presence, and it accurately records my words and actions.  Arian Bishop MD  March 17, 2017

## 2017-03-17 NOTE — DISCHARGE INSTRUCTIONS
Aturdimiento o desmayo: Instrucciones de cuidado - [ [place] or Faintness: Care Instructions ]  Instrucciones de cuidado  El aturdimiento es la sensación de que está a punto de desmayarse o de \"perder el conocimiento\". No se siente shannan si usted o lo que le rodea se [place]. Es distinto del vértigo, que es la sensación de que usted o las cosas que le rodean dan vueltas o se inclinan. El aturdimiento suele desaparecer o mejorar cuando se acuesta. Si el aturdimiento empeora, esto puede conducir a un desvanecimiento. Es común sentirse aturdido de [employer]. Por lo general, el aturdimiento no se debe a un problema grave. A menudo es causado por tayo disminución de corta duración de la presión arterial y el flujo de nakia hacia la hudson que se produce al ponerse de pie con demasiada rapidez cuando está acostado o sentado. La atención de seguimiento es tayo parte clave de bower tratamiento y seguridad. Asegúrese de hacer y acudir a todas las citas, y llame a bower médico si está teniendo problemas. También es tayo buena idea saber los resultados de graeme exámenes y mantener tayo lista de los medicamentos que juliette. ¿Cómo puede cuidarse en el hogar? · Acuéstese efraín 1 o 2 minutos cuando se sienta aturdido. Después de acostarse, siéntese lentamente y permanezca sentado de 1 a 2 minutos antes de ponerse de pie lentamente. · Evite los movimientos, las posturas o las actividades que le hayan producido aturdimiento en el pasado. · Descanse mucho, en especial si está resfriado o tiene gripe, ya que estas pueden causar aturdimiento. · Asegúrese de beber abundante líquido, en especial si tiene fiebre o si ha estado sudando. · No conduzca ni se ponga a sí mismo o ponga a otros en peligro mientras se sienta aturdido. ¿Cuándo debe pedir ayuda? Llame al 911 en cualquier momento que considere que necesita atención de [place].  Por ejemplo, llame si:  · Tiene síntomas de un ataque cerebral. Estos pueden incluir:  ¨ Entumecimiento, hormigueo, debilidad o parálisis repentinos en la guanako, el brazo o la pierna, sobre todo si ocurre en un solo lado del cuerpo. ¨ Cambios súbitos en la vista. ¨ Problemas repentinos para hablar. ¨ Confusión súbita o dificultad repentina para comprender frases sencillas. ¨ Problemas repentinos para caminar o mantener el equilibrio. ¨ Un dolor de hudson intenso y repentino, distinto a los jenni de hudson anteriores. · Tiene síntomas de un ataque al corazón. Estos podrían incluir:  ¨ Dolor o presión en el pecho, o tayo sensación extraña en el pecho. ¨ Sudoración. ¨ Falta de aire. ¨ Náuseas o vómito. ¨ Dolor, presión o tayo sensación extraña en la espalda, el martina, la mandíbula, la parte superior del abdomen, o en ryan o ambos hombros o brazos. ¨ Aturdimiento o debilidad repentina. ¨ Latidos cardíacos rápidos o irregulares. Cuando llame al 911, es posible que le digan que mastique 1 aspirina para adultos o 2 a 4 aspirinas de dosis baja. Espere a la ambulancia. No trate de conducir usted mismo un automóvil. Preste especial atención a los cambios en bower lionel y asegúrese de comunicarse con bower médico si:  · El aturdimiento [place] o no mejora con los cuidados en el hogar. ¿Dónde puede encontrar más información en inglés? Debbie Packer a http://jeannine-calderon.info/. Augie Goon M445 en la búsqueda para aprender más acerca de \"Aturdimiento o desmayo: Instrucciones de cuidado - [ [place] or Faintness: Care Instructions ]. \"  Revisado: [address] [place], 2016  Versión del contenido: 11.1  © 7134-7013 Healthwise, Incorporated. Las instrucciones de cuidado fueron adaptadas bajo licencia por [employer] (which disclaims liability or warranty for this information). Si usted tiene [place] afección médica o sobre estas instrucciones, siempre pregunte a bower profesional de lionel.  [employer] niega toda garantía o responsabilidad por bower uso de esta información.

## 2017-03-30 ENCOUNTER — OFFICE VISIT (OUTPATIENT)
Dept: CARDIOLOGY CLINIC | Age: 47
End: 2017-03-30

## 2017-03-30 VITALS
RESPIRATION RATE: 18 BRPM | WEIGHT: 137.6 LBS | OXYGEN SATURATION: 94 % | SYSTOLIC BLOOD PRESSURE: 118 MMHG | HEIGHT: 64 IN | BODY MASS INDEX: 23.49 KG/M2 | DIASTOLIC BLOOD PRESSURE: 74 MMHG

## 2017-03-30 DIAGNOSIS — R55 SYNCOPE AND COLLAPSE: ICD-10-CM

## 2017-03-30 DIAGNOSIS — R55 VASOVAGAL SYNCOPE: Primary | ICD-10-CM

## 2017-03-30 NOTE — MR AVS SNAPSHOT
Visit Information Date & Time Provider Department Dept. Phone Encounter #  
 3/30/2017  8:30 AM Merlinda Forget, MD [facility] [address] [id number] Upcoming Health Maintenance Date Due DTaP/Tdap/Td series (1 - Tdap) 3/2/1990 PAP AKA CERVICAL CYTOLOGY 3/2/1990 INFLUENZA AGE 9 TO ADULT 8/1/2016 Allergies as of 3/30/2017  Review Complete On: 3/30/2017 By: Merlinda Forget, MD  
 No Known Allergies Current Immunizations  Never Reviewed No immunizations on file. Not reviewed this visit You Were Diagnosed With   
  
 Codes Comments Vasovagal syncope    -  Primary ICD-10-CM: R55 
ICD-9-CM: 780.2 Syncope and collapse     ICD-10-CM: R55 
ICD-9-CM: 780. 2 Vitals BP Resp Height(growth percentile) Weight(growth percentile) SpO2 BMI  
 118/74 18 5' 4\" (1.626 m) 137 lb 9.6 oz (62.4 kg) 94% 23.62 kg/m2 OB Status Smoking Status Hysterectomy Never Smoker BMI and BSA Data Body Mass Index Body Surface Area  
 23.62 kg/m 2 1.68 m 2 Your Updated Medication List  
  
   
This list is accurate as of: 3/30/17  9:17 AM.  Always use your most recent med list.  
  
  
  
  
 ibuprofen 200 mg tablet Commonly known as:  MOTRIN Take 200 mg by mouth two (2) times daily as needed for Pain (pain as needed). We Performed the Following AMB POC EKG ROUTINE W/ 12 LEADS, INTER & REP [83569 CPT(R)] Introducing [facility]! [facility] introduces [employer] patient portal. Now you can access parts of your medical record, email your doctor's office, and request medication refills online. 1. In your internet browser, go to [email or web address] 2. Click on the First Time User? Click Here link in the Sign In box. You will see the New Member Sign Up page. 3. Enter your [employer] Access Code exactly as it appears below.  You will not need to use this code after youve completed the sign-up process. If you do not sign up before the expiration date, you must request a new code. · [employer] Access Code: [email or web address] Expires: 6/12/2017  2:05 PM 
 
4. Enter the last four digits of your Social Security Number (xxxx) and Date of Birth (mm/dd/yyyy) as indicated and click Submit. You will be taken to the next sign-up page. 5. Create a [employer] ID. This will be your [employer] login ID and cannot be changed, so think of one that is secure and easy to remember. 6. Create a [employer] password. You can change your password at any time. 7. Enter your Password Reset Question and Answer. This can be used at a later time if you forget your password. 8. Enter your e-mail address. You will receive e-mail notification when new information is available in [address]. 9. Click Sign Up. You can now view and download portions of your medical record. 10. Click the Download Summary menu link to download a portable copy of your medical information. If you have questions, please visit the Frequently Asked Questions section of the [employer] website. Remember, [employer] is NOT to be used for urgent needs. For medical emergencies, dial 911. Now available from your iPhone and Android! Please provide this summary of care documentation to your next provider. Your primary care clinician is listed as SKIP TORRES. If you have any questions after today's visit, please call [phone].

## 2017-03-30 NOTE — PROGRESS NOTES
PATIENT NAME: Sinai Tom         49 y.o.      1970              DATE:3/30/2017    REASON FOR VISIT: Syncope    HISTORY OF PRESENT ILLNESS: Had a syncopal episode recently. Was experiencing some upper abdominal discomfort. Set down at a table with her friend. Became lightheadedness and lost consciousness for a few minutes. No seizure activity was witnessed. She was admitted to the hospital and monitored. No arrhythmias were seen. An echocardiogram was unremarkable. Cardiolite scan was negative for ischemia. The patient denies chest pain and other symptoms suggestive of angina. Denies palpitation. Denies dyspnea on exertion, orthopnea, paroxysmal nocturnal dyspnea    PAST MEDICAL HISTORY:   Past Medical History:  1999: Gestational diabetes  No date: Syncope    PAST SURGICAL HISTORY:   Past Surgical History:  No date: HX HYSTERECTOMY      SOCIAL HISTORY:  Social History    Marital status: SINGLE              Spouse name:                       Years of education:                 Number of children:               Social History Main Topics    Smoking status: Never Smoker                                                                Alcohol use: No              Drug use: No                ALLERGIES:   No Known Allergies     CURRENT MEDICATIONS:   Current Outpatient Prescriptions:  ibuprofen (MOTRIN) 200 mg tablet, Take 200 mg by mouth two (2) times daily as needed for Pain (pain as needed). No current facility-administered medications for this visit.        REVIEW of SYSTEMS:History obtained from chart review and the patient  General ROS: negative for - weight gain or weight loss  Hematological and Lymphatic ROS: negative for - bleeding problems  Respiratory ROS: no cough, shortness of breath, or wheezing  Cardiovascular ROS: Please see history of present illness  Neurological ROS: no TIA or stroke symptoms     PHYSICAL EXAMINATION:   /74  Resp 18  Ht 5' 4\" (1.626 m)  Wt 62.4 kg (137 lb 9.6 oz)  SpO2 94%  BMI 23.62 kg/m2  BP Readings from Last 3 Encounters:  03/30/17 : 118/74  03/17/17 : 99/64  03/15/17 : 109/69    Pulse Readings from Last 3 Encounters:  03/17/17 : 71  03/15/17 : 89  12/19/14 : 63    Wt Readings from Last 3 Encounters:  03/30/17 : 62.4 kg (137 lb 9.6 oz)  03/17/17 : 63.5 kg (140 lb)  03/15/17 : 63.5 kg (140 lb)    General: Well-developed female no apparent distress. HEENT: Sclera clear. Mucous membranes pink and moist.  Neck: No jugular venous distention. Carotid upstrokes 2+ without bruits. Chest: Clear to percussion and auscultation. Heart: PMI not palpable. Regular rhythm. No murmur or gallop. Abdomen: Nontender without masses or organomegaly. Extremities: No edema. Dorsalis pedis and posterior tibial pulses 2+. Skin: Warm and dry. No stasis changes. Neuro: Alert, oriented, speech WNL, no facial asymmetry. Gait WNL. EKG: Within normal limits    IMPRESSION:   Probable vasovagal syncope    PLAN:  The patient has had an adequate cardiovascular workup. She does not require any additional cardiac evaluation at this time. Follow-up with primary care physician. Return as needed    The diagnoses and plan were discussed with patient. All questions answered. Plan of care agreed to by all concerned. Medina Hollis.  MD Riley       ,

## 2017-08-09 ENCOUNTER — HOSPITAL ENCOUNTER (EMERGENCY)
Age: 47
Discharge: HOME | End: 2017-08-09
Attending: EMERGENCY MEDICINE
Payer: SUBSIDIZED

## 2017-08-09 ENCOUNTER — APPOINTMENT (OUTPATIENT)
Dept: GENERAL RADIOLOGY | Age: 47
End: 2017-08-09
Attending: PHYSICIAN ASSISTANT
Payer: SUBSIDIZED

## 2017-08-09 VITALS
RESPIRATION RATE: 18 BRPM | DIASTOLIC BLOOD PRESSURE: 65 MMHG | OXYGEN SATURATION: 99 % | BODY MASS INDEX: 23.05 KG/M2 | SYSTOLIC BLOOD PRESSURE: 114 MMHG | HEART RATE: 74 BPM | HEIGHT: 64 IN | TEMPERATURE: 98.3 F | WEIGHT: 135 LBS

## 2017-08-09 DIAGNOSIS — S39.012A LUMBAR STRAIN, INITIAL ENCOUNTER: Primary | ICD-10-CM

## 2017-08-09 LAB
APPEARANCE UR: CLEAR
BILIRUB UR QL: NEGATIVE
COLOR UR: YELLOW
EPITH CASTS URNS QL MICRO: NORMAL /LPF (ref 0–5)
GLUCOSE UR STRIP.AUTO-MCNC: NEGATIVE MG/DL
HGB UR QL STRIP: ABNORMAL
KETONES UR QL STRIP.AUTO: NEGATIVE MG/DL
LEUKOCYTE ESTERASE UR QL STRIP.AUTO: ABNORMAL
NITRITE UR QL STRIP.AUTO: NEGATIVE
PH UR STRIP: 6 [PH] (ref 5–8)
PROT UR STRIP-MCNC: NEGATIVE MG/DL
RBC #/AREA URNS HPF: NORMAL /HPF (ref 0–5)
SP GR UR REFRACTOMETRY: 1.01 (ref 1–1.03)
UROBILINOGEN UR QL STRIP.AUTO: 0.2 EU/DL (ref 0.2–1)
WBC URNS QL MICRO: NORMAL /HPF (ref 0–4)

## 2017-08-09 PROCEDURE — 74011250637 HC RX REV CODE- 250/637: Performed by: PHYSICIAN ASSISTANT

## 2017-08-09 PROCEDURE — 81001 URINALYSIS AUTO W/SCOPE: CPT | Performed by: PHYSICIAN ASSISTANT

## 2017-08-09 PROCEDURE — 99283 EMERGENCY DEPT VISIT LOW MDM: CPT

## 2017-08-09 PROCEDURE — 72110 X-RAY EXAM L-2 SPINE 4/>VWS: CPT

## 2017-08-09 RX ORDER — CYCLOBENZAPRINE HCL 10 MG
10 TABLET ORAL
Qty: 14 TAB | Refills: 0 | Status: SHIPPED | OUTPATIENT
Start: 2017-08-09 | End: 2022-05-30

## 2017-08-09 RX ORDER — ACETAMINOPHEN AND CODEINE PHOSPHATE 300; 30 MG/1; MG/1
1 TABLET ORAL
Qty: 6 TAB | Refills: 0 | Status: SHIPPED | OUTPATIENT
Start: 2017-08-09 | End: 2022-05-30

## 2017-08-09 RX ORDER — MELATONIN
DAILY
COMMUNITY
End: 2022-05-30

## 2017-08-09 RX ORDER — DOCUSATE SODIUM 100 MG/1
100 CAPSULE, LIQUID FILLED ORAL 2 TIMES DAILY
Qty: 60 CAP | Refills: 2 | Status: SHIPPED | OUTPATIENT
Start: 2017-08-09 | End: 2017-11-07

## 2017-08-09 RX ORDER — NAPROXEN 500 MG/1
500 TABLET ORAL 2 TIMES DAILY WITH MEALS
Qty: 20 TAB | Refills: 0 | Status: SHIPPED | OUTPATIENT
Start: 2017-08-09 | End: 2017-08-19

## 2017-08-09 RX ORDER — CYCLOBENZAPRINE HCL 10 MG
10 TABLET ORAL
Status: COMPLETED | OUTPATIENT
Start: 2017-08-09 | End: 2017-08-09

## 2017-08-09 RX ORDER — NAPROXEN 250 MG/1
500 TABLET ORAL
Status: COMPLETED | OUTPATIENT
Start: 2017-08-09 | End: 2017-08-09

## 2017-08-09 RX ADMIN — CYCLOBENZAPRINE HYDROCHLORIDE 10 MG: 10 TABLET, FILM COATED ORAL at 11:20

## 2017-08-09 RX ADMIN — NAPROXEN 500 MG: 250 TABLET ORAL at 11:20

## 2017-08-09 NOTE — LETTER
[facility] EMERGENCY DEPT 
[address] [id number] 
[phone] Work/School Note Date: 8/9/2017 To Whom It May concern: Adrian Taylor was seen and treated today in the emergency room by the following provider(s): 
Attending Provider: Maday Ndiaye MD 
Physician Assistant: Jesenia Jacobs. Adrian Taylor may return to work on 8/12/17. Sincerely, 
 
 
 
 
Jesenia Jacobs

## 2017-08-09 NOTE — ED PROVIDER NOTES
Patient is a 49 y.o. female presenting with back pain. The history is provided by the patient and a relative. Back Pain    This is a new problem. The current episode started more than 2 days ago. The problem has not changed since onset. The problem occurs constantly. Patient reports not work related injury. Associated with: pt got up from seated position and felt the back pain. The pain is present in the lumbar spine. The quality of the pain is described as aching and cramping. The pain does not radiate. The pain is moderate. The symptoms are aggravated by certain positions. Pertinent negatives include no fever, no numbness, no weight loss, no abdominal pain, no bowel incontinence, no perianal numbness, no bladder incontinence, no dysuria, no pelvic pain, no leg pain, no paresthesias and no weakness. She has tried NSAIDs and heat for the symptoms. The treatment provided mild relief. The patient's surgical history non-contributory        Past Medical History:   Diagnosis Date    Gestational diabetes 1999    Syncope        Past Surgical History:   Procedure Laterality Date    HX HYSTERECTOMY           Family History:   Problem Relation Age of Onset    Breast Cancer Paternal Aunt     Ovarian Cancer Paternal Grandmother     Diabetes Mother     Hypertension Father     Hypertension Brother        Social History     Social History    Marital status: SINGLE     Spouse name: N/A    Number of children: N/A    Years of education: N/A     Occupational History    Not on file. Social History Main Topics    Smoking status: Never Smoker    Smokeless tobacco: Not on file    Alcohol use No    Drug use: No    Sexual activity: Not on file     Other Topics Concern    Not on file     Social History Narrative         ALLERGIES: Review of patient's allergies indicates no known allergies. Review of Systems   Constitutional: Negative for chills, fever and weight loss.    Gastrointestinal: Negative for abdominal pain and bowel incontinence. Genitourinary: Negative for bladder incontinence, dysuria, hematuria and pelvic pain. Musculoskeletal: Positive for back pain and gait problem (ambulates with cane due to pain). Neurological: Negative for weakness, numbness and paresthesias. All other systems reviewed and are negative. Vitals:    08/09/17 1107   BP: 114/65   Pulse: 74   Resp: 18   Temp: 98.3 °F (36.8 °C)   SpO2: 99%   Weight: 61.2 kg (135 lb)   Height: 5' 4\" (1.626 m)            Physical Exam   Constitutional: She appears well-developed and well-nourished. HENT:   Head: Normocephalic and atraumatic. Neck: Normal range of motion. Cardiovascular: Normal rate, regular rhythm and normal heart sounds. Pulmonary/Chest: Effort normal and breath sounds normal.   Abdominal: Soft. Bowel sounds are normal. She exhibits no distension and no mass. There is no tenderness. There is no rebound and no guarding. Musculoskeletal:        Lumbar back: She exhibits decreased range of motion (mildly) and tenderness (lumbar spine, mild; b/l paravertebrals TTP). She exhibits no spasm and normal pulse. No saddle anesthesia, negative SLR bilaterally   Neurological: She is alert. Skin: Skin is warm and dry. Nursing note and vitals reviewed.        MDM  Number of Diagnoses or Management Options  Diagnosis management comments: Differential Diagnosis: Musculoskeletal pain, myofascial strain/sprain, muscle spasm, spondylolisthesis, spondylosis, DJD, OA, sciatica, aortic aneurysm, vertebral infection, renal colic, pyelonephritis, epidural abscess, epidural hematoma, herniated nucleus pulposis, malignancy             Amount and/or Complexity of Data Reviewed  Clinical lab tests: ordered and reviewed  Tests in the radiology section of CPT®: ordered and reviewed      ED Course       Procedures        RESULTS:    XR SPINE LUMB MIN 4 V    (Results Pending)       Labs Reviewed   URINALYSIS W/ RFLX MICROSCOPIC - Abnormal; Notable for the following:        Result Value    Blood TRACE (*)     Leukocyte Esterase SMALL (*)     All other components within normal limits   URINE MICROSCOPIC ONLY       Recent Results (from the past 12 hour(s))   URINALYSIS W/ RFLX MICROSCOPIC    Collection Time: 08/09/17 11:20 AM   Result Value Ref Range    Color YELLOW      Appearance CLEAR      Specific gravity 1.008 1.005 - 1.030      pH (UA) 6.0 5.0 - 8.0      Protein NEGATIVE  NEG mg/dL    Glucose NEGATIVE  NEG mg/dL    Ketone NEGATIVE  NEG mg/dL    Bilirubin NEGATIVE  NEG      Blood TRACE (A) NEG      Urobilinogen 0.2 0.2 - 1.0 EU/dL    Nitrites NEGATIVE  NEG      Leukocyte Esterase SMALL (A) NEG     URINE MICROSCOPIC ONLY    Collection Time: 08/09/17 11:20 AM   Result Value Ref Range    WBC 0 to 3 0 - 4 /hpf    RBC 0 to 3 0 - 5 /hpf    Epithelial cells 1+ 0 - 5 /lpf     PROGRESS NOTE:   11:20 AM  Initial assessment completed. Written by Lona Sabillon PA-C     11:55 AM  Reviewed results with patient and family member. Pt notes last normal BM yesterday. Hx of intermittent constipation. Will prescribe Colace. RADIOLOGY FINDINGS  X-ray shows no fracture, dislocation or mass. Some stool burden  Pending review by Radiologist  Recorded by Lona Sabillon PA-C      IMPRESSION AND MEDICAL DECISION MAKING:  Based upon the patients presentation with noted HPI and PE, along with the work up done in the emergency department, I believe that the patient is having low back strain. No s/s of epidural abscess, cauda equina, UTI, or acute abdominal process. CONDITION ON DISCHARGE:  stable    DISCHARGE NOTE:  11:55 AM  Zhane Orosco's  results have been reviewed with her. She has been counseled regarding her diagnosis, treatment, and plan. She verbally conveys understanding and agreement of the signs, symptoms, diagnosis, treatment and prognosis and additionally agrees to follow up as discussed.   She also agrees with the care-plan and conveys that all of her questions have been answered. I have also provided discharge instructions for her that include: educational information regarding their diagnosis and treatment, and list of reasons why they would want to return to the ED prior to their follow-up appointment, should her condition change. CLINICAL IMPRESSION:    1. Lumbar strain, initial encounter        AFTER VISIT PLAN:    Current Discharge Medication List      START taking these medications    Details   cyclobenzaprine (FLEXERIL) 10 mg tablet Take 1 Tab by mouth three (3) times daily as needed for Muscle Spasm(s). Qty: 14 Tab, Refills: 0      naproxen (NAPROSYN) 500 mg tablet Take 1 Tab by mouth two (2) times daily (with meals) for 10 days. Qty: 20 Tab, Refills: 0      acetaminophen-codeine (TYLENOL-CODEINE #3) 300-30 mg per tablet Take 1 Tab by mouth every four (4) hours as needed for Pain. Max Daily Amount: 6 Tabs.   Qty: 6 Tab, Refills: 0              Follow-up Information     Follow up With Details Comments Contact Info    [facility] EMERGENCY DEPT  If symptoms worsen [address] Cici Xavier MD In 2 days  [address] [facility]  [phone]             Written by Aye Santiago PA-C

## 2017-08-09 NOTE — DISCHARGE INSTRUCTIONS
Back Strain: Care Instructions  Your Care Instructions    Back strain happens when you overstretch, or pull, a muscle in your back. You may hurt your back in an accident or when you exercise or lift something. Most back pain will get better with rest and time. You can take care of yourself at home to help your back heal.  Follow-up care is a key part of your treatment and safety. Be sure to make and go to all appointments, and call your doctor if you are having problems. It's also a good idea to know your test results and keep a list of the medicines you take. How can you care for yourself at home? · Try to stay as active as you can, but stop or reduce any activity that causes pain. · Put ice or a cold pack on the sore muscle for 10 to 20 minutes at a time to stop swelling. Try this every 1 to 2 hours for 3 days (when you are awake) or until the swelling goes down. Put a thin cloth between the ice pack and your skin. · After 2 or 3 days, apply a heating pad on low or a warm cloth to your back. Some doctors suggest that you go back and forth between hot and cold treatments. · Take pain medicines exactly as directed. ¨ If the doctor gave you a prescription medicine for pain, take it as prescribed. ¨ If you are not taking a prescription pain medicine, ask your doctor if you can take an over-the-counter medicine. · Try sleeping on your side with a pillow between your legs. Or put a pillow under your knees when you lie on your back. These measures can ease pain in your lower back. · Return to your usual level of activity slowly. When should you call for help? Call 911 anytime you think you may need emergency care. For example, call if:  · You are unable to move a leg at all. Call your doctor now or seek immediate medical care if:  · You have new or worse symptoms in your legs, belly, or buttocks. Symptoms may include:  ¨ Numbness or tingling. ¨ Weakness. ¨ Pain.   · You lose bladder or bowel control. Watch closely for changes in your health, and be sure to contact your doctor if you are not getting better as expected. Where can you learn more? Go to http://jeannine-calderon.info/. Enter A773 in the search box to learn more about \"Back Strain: Care Instructions. \"  Current as of: 2017  Content Version: 11.3  © 0906-4655 [employer]. Care instructions adapted under license by [employer] (which disclaims liability or warranty for this information). If you have questions about a medical condition or this instruction, always ask your healthcare professional. [address] any warranty or liability for your use of this information. [email or web address] Activation    Thank you for requesting access to [email or web address]. Please follow the instructions below to securely access and download your online medical record. [email or web address] allows you to send messages to your doctor, view your test results, renew your prescriptions, schedule appointments, and more. How Do I Sign Up? 1. In your internet browser, go to www.[employer]  2. Click on the First Time User? Click Here link in the Sign In box. You will be redirect to the New Member Sign Up page. 3. Enter your [email or web address] Access Code exactly as it appears below. You will not need to use this code after youve completed the sign-up process. If you do not sign up before the expiration date, you must request a new code. [email or web address] Access Code: [email or web address]  Expires: 2017 11:12 AM (This is the date your [email or web address] access code will )    4. Enter the last four digits of your Social Security Number (xxxx) and Date of Birth (mm/dd/yyyy) as indicated and click Submit. You will be taken to the next sign-up page. 5. Create a [email or web address] ID. This will be your [email or web address] login ID and cannot be changed, so think of one that is secure and easy to remember. 6. Create a [email or web address] password.  You can change your password at any time. 7. Enter your Password Reset Question and Answer. This can be used at a later time if you forget your password. 8. Enter your e-mail address. You will receive e-mail notification when new information is available in [address] 9. Click Sign Up. You can now view and download portions of your medical record. 10. Click the Download Summary menu link to download a portable copy of your medical information. Additional Information    If you have questions, please visit the Frequently Asked Questions section of the [employer] website at [email or web address] Remember, [employer] is NOT to be used for urgent needs. For medical emergencies, dial 911.         RESULTS:    XR SPINE LUMB MIN 4 V    (Results Pending)       Labs Reviewed   URINALYSIS W/ RFLX MICROSCOPIC - Abnormal; Notable for the following:        Result Value    Blood TRACE (*)     Leukocyte Esterase SMALL (*)     All other components within normal limits   URINE MICROSCOPIC ONLY       Recent Results (from the past 12 hour(s))   URINALYSIS W/ RFLX MICROSCOPIC    Collection Time: 08/09/17 11:20 AM   Result Value Ref Range    Color YELLOW      Appearance CLEAR      Specific gravity 1.008 1.005 - 1.030      pH (UA) 6.0 5.0 - 8.0      Protein NEGATIVE  NEG mg/dL    Glucose NEGATIVE  NEG mg/dL    Ketone NEGATIVE  NEG mg/dL    Bilirubin NEGATIVE  NEG      Blood TRACE (A) NEG      Urobilinogen 0.2 0.2 - 1.0 EU/dL    Nitrites NEGATIVE  NEG      Leukocyte Esterase SMALL (A) NEG     URINE MICROSCOPIC ONLY    Collection Time: 08/09/17 11:20 AM   Result Value Ref Range    WBC 0 to 3 0 - 4 /hpf    RBC 0 to 3 0 - 5 /hpf    Epithelial cells 1+ 0 - 5 /lpf

## 2018-01-18 ENCOUNTER — HOSPITAL ENCOUNTER (OUTPATIENT)
Dept: ULTRASOUND IMAGING | Age: 48
Discharge: HOME | End: 2018-01-18
Attending: OBSTETRICS & GYNECOLOGY
Payer: SELF-PAY

## 2018-01-18 ENCOUNTER — HOSPITAL ENCOUNTER (OUTPATIENT)
Dept: MAMMOGRAPHY | Age: 48
Discharge: HOME | End: 2018-01-18
Attending: OBSTETRICS & GYNECOLOGY
Payer: SELF-PAY

## 2018-01-18 DIAGNOSIS — N64.4 BREAST PAIN: ICD-10-CM

## 2018-01-18 PROCEDURE — 77066 DX MAMMO INCL CAD BI: CPT

## 2018-01-18 PROCEDURE — 76642 ULTRASOUND BREAST LIMITED: CPT

## 2019-03-19 ENCOUNTER — APPOINTMENT (OUTPATIENT)
Dept: GENERAL RADIOLOGY | Age: 49
End: 2019-03-19
Attending: EMERGENCY MEDICINE
Payer: COMMERCIAL

## 2019-03-19 ENCOUNTER — HOSPITAL ENCOUNTER (EMERGENCY)
Age: 49
Discharge: HOME | End: 2019-03-20
Attending: EMERGENCY MEDICINE
Payer: COMMERCIAL

## 2019-03-19 VITALS
BODY MASS INDEX: 21.16 KG/M2 | RESPIRATION RATE: 16 BRPM | HEART RATE: 67 BPM | OXYGEN SATURATION: 100 % | SYSTOLIC BLOOD PRESSURE: 130 MMHG | HEIGHT: 65 IN | WEIGHT: 127 LBS | DIASTOLIC BLOOD PRESSURE: 83 MMHG | TEMPERATURE: 98.1 F

## 2019-03-19 DIAGNOSIS — R07.9 CHEST PAIN, UNSPECIFIED TYPE: Primary | ICD-10-CM

## 2019-03-19 PROCEDURE — 80053 COMPREHEN METABOLIC PANEL: CPT

## 2019-03-19 PROCEDURE — 71045 X-RAY EXAM CHEST 1 VIEW: CPT

## 2019-03-19 PROCEDURE — 85025 COMPLETE CBC W/AUTO DIFF WBC: CPT

## 2019-03-19 PROCEDURE — 93005 ELECTROCARDIOGRAM TRACING: CPT

## 2019-03-19 PROCEDURE — 82550 ASSAY OF CK (CPK): CPT

## 2019-03-19 PROCEDURE — 99283 EMERGENCY DEPT VISIT LOW MDM: CPT

## 2019-03-20 LAB
ALBUMIN SERPL-MCNC: 4 G/DL (ref 3.4–5)
ALBUMIN/GLOB SERPL: 1.1 {RATIO} (ref 0.8–1.7)
ALP SERPL-CCNC: 63 U/L (ref 45–117)
ALT SERPL-CCNC: 20 U/L (ref 13–56)
ANION GAP SERPL CALC-SCNC: 4 MMOL/L (ref 3–18)
AST SERPL-CCNC: 11 U/L (ref 15–37)
ATRIAL RATE: 73 BPM
BASOPHILS # BLD: 0.1 K/UL (ref 0–0.1)
BASOPHILS NFR BLD: 1 % (ref 0–2)
BILIRUB SERPL-MCNC: 0.3 MG/DL (ref 0.2–1)
BUN SERPL-MCNC: 11 MG/DL (ref 7–18)
BUN/CREAT SERPL: 11 (ref 12–20)
CALCIUM SERPL-MCNC: 9.1 MG/DL (ref 8.5–10.1)
CALCULATED P AXIS, ECG09: 70 DEGREES
CALCULATED R AXIS, ECG10: 23 DEGREES
CALCULATED T AXIS, ECG11: 28 DEGREES
CHLORIDE SERPL-SCNC: 108 MMOL/L (ref 100–108)
CK MB CFR SERPL CALC: NORMAL % (ref 0–4)
CK MB CFR SERPL CALC: NORMAL % (ref 0–4)
CK MB SERPL-MCNC: <1 NG/ML (ref 5–25)
CK MB SERPL-MCNC: <1 NG/ML (ref 5–25)
CK SERPL-CCNC: 76 U/L (ref 26–192)
CK SERPL-CCNC: 96 U/L (ref 26–192)
CO2 SERPL-SCNC: 30 MMOL/L (ref 21–32)
CREAT SERPL-MCNC: 0.99 MG/DL (ref 0.6–1.3)
DIAGNOSIS, 93000: NORMAL
DIFFERENTIAL METHOD BLD: ABNORMAL
EOSINOPHIL # BLD: 0.2 K/UL (ref 0–0.4)
EOSINOPHIL NFR BLD: 3 % (ref 0–5)
ERYTHROCYTE [DISTWIDTH] IN BLOOD BY AUTOMATED COUNT: 12.9 % (ref 11.6–14.5)
GLOBULIN SER CALC-MCNC: 3.5 G/DL (ref 2–4)
GLUCOSE SERPL-MCNC: 90 MG/DL (ref 74–99)
HCT VFR BLD AUTO: 39.9 % (ref 35–45)
HGB BLD-MCNC: 13.1 G/DL (ref 12–16)
LYMPHOCYTES # BLD: 3.8 K/UL (ref 0.9–3.6)
LYMPHOCYTES NFR BLD: 44 % (ref 21–52)
MCH RBC QN AUTO: 28.7 PG (ref 24–34)
MCHC RBC AUTO-ENTMCNC: 32.8 G/DL (ref 31–37)
MCV RBC AUTO: 87.5 FL (ref 74–97)
MONOCYTES # BLD: 0.6 K/UL (ref 0.05–1.2)
MONOCYTES NFR BLD: 7 % (ref 3–10)
NEUTS SEG # BLD: 4 K/UL (ref 1.8–8)
NEUTS SEG NFR BLD: 45 % (ref 40–73)
P-R INTERVAL, ECG05: 166 MS
PLATELET # BLD AUTO: 354 K/UL (ref 135–420)
PMV BLD AUTO: 9.7 FL (ref 9.2–11.8)
POTASSIUM SERPL-SCNC: 3.3 MMOL/L (ref 3.5–5.5)
PROT SERPL-MCNC: 7.5 G/DL (ref 6.4–8.2)
Q-T INTERVAL, ECG07: 368 MS
QRS DURATION, ECG06: 82 MS
QTC CALCULATION (BEZET), ECG08: 405 MS
RBC # BLD AUTO: 4.56 M/UL (ref 4.2–5.3)
SODIUM SERPL-SCNC: 142 MMOL/L (ref 136–145)
TROPONIN I SERPL-MCNC: <0.02 NG/ML (ref 0–0.04)
TROPONIN I SERPL-MCNC: <0.02 NG/ML (ref 0–0.04)
VENTRICULAR RATE, ECG03: 73 BPM
WBC # BLD AUTO: 8.7 K/UL (ref 4.6–13.2)

## 2019-03-20 PROCEDURE — 74011250636 HC RX REV CODE- 250/636

## 2019-03-20 PROCEDURE — 82550 ASSAY OF CK (CPK): CPT

## 2019-03-20 RX ORDER — MORPHINE SULFATE 4 MG/ML
INJECTION INTRAVENOUS
Status: DISCONTINUED
Start: 2019-03-20 | End: 2019-03-20 | Stop reason: HOSPADM

## 2019-03-20 NOTE — ED TRIAGE NOTES
Pt states she started having chest pain around noon that radiates down her left arm causing her fingers to become numb.

## 2019-03-20 NOTE — ED PROVIDER NOTES
EMERGENCY DEPARTMENT HISTORY AND PHYSICAL EXAM 
 
12:52 AM 
 
 
Date: 3/19/2019 Patient Name: Hemanth Stock History of Presenting Illness Chief Complaint Patient presents with  Chest Pain  Numbness  
  fingers left History Provided By: Patient Additional History (Context): Hemanth Stock is a 47 y.o. female with No significant past medical history who presents with complaint of sharp left-sided chest pain with tingling in left fingers which began at 12 noon yesterday and has been continuously present since that time. The pain at this time is mild but present. There is no worsening factors, the patient has not done anything to try to treat the pain at home prior to arrival.  She denies any lightheadedness, diaphoresis, nausea, vomiting, dyspnea or other symptoms. She denies any history of smoking, hypertension, diabetes or family history of cardiac disease. Lorenzo Nieves PCP: Severo Canales, MD 
 
Current Facility-Administered Medications Medication Dose Route Frequency Provider Last Rate Last Dose  morphine 4 mg/mL injection Current Outpatient Medications Medication Sig Dispense Refill  cholecalciferol (VITAMIN D3) 1,000 unit tablet Take  by mouth daily.  cyclobenzaprine (FLEXERIL) 10 mg tablet Take 1 Tab by mouth three (3) times daily as needed for Muscle Spasm(s). 14 Tab 0  
 acetaminophen-codeine (TYLENOL-CODEINE #3) 300-30 mg per tablet Take 1 Tab by mouth every four (4) hours as needed for Pain. Max Daily Amount: 6 Tabs. 6 Tab 0 Past History Past Medical History: 
Past Medical History:  
Diagnosis Date  Gestational diabetes 1999  Syncope Past Surgical History: 
Past Surgical History:  
Procedure Laterality Date  HX HYSTERECTOMY Family History: 
Family History Problem Relation Age of Onset  Breast Cancer Paternal Aunt  Ovarian Cancer Paternal Grandmother  Diabetes Mother  Hypertension Father  Hypertension Brother Social History: 
Social History Tobacco Use  Smoking status: Never Smoker  Smokeless tobacco: Never Used Substance Use Topics  Alcohol use: No  
 Drug use: No  
 
 
Allergies: 
No Known Allergies Review of Systems Review of Systems Constitutional: Negative for activity change and appetite change. HENT: Negative for congestion. Eyes: Negative for visual disturbance. Respiratory: Negative for cough and shortness of breath. Cardiovascular: Positive for chest pain. Gastrointestinal: Negative for abdominal pain, diarrhea, nausea and vomiting. Genitourinary: Negative for dysuria. Musculoskeletal: Negative for arthralgias and myalgias. Skin: Negative for rash. Neurological: Negative for weakness and numbness. Physical Exam  
 
Visit Vitals /83 (BP 1 Location: Left arm, BP Patient Position: Sitting) Pulse 67 Temp 98.1 °F (36.7 °C) Resp 16 Ht 5' 5\" (1.651 m) Wt 57.6 kg (127 lb) SpO2 100% BMI 21.13 kg/m² Physical Exam  
Constitutional: She is oriented to person, place, and time. She appears well-developed and well-nourished. HENT:  
Head: Normocephalic and atraumatic. Mouth/Throat: Oropharynx is clear and moist.  
Eyes: Conjunctivae are normal.  
Neck: Normal range of motion. Neck supple. No JVD present. Cardiovascular: Normal rate, regular rhythm, normal heart sounds and intact distal pulses. No murmur heard. Pulmonary/Chest: Effort normal and breath sounds normal.  
Abdominal: Soft. Bowel sounds are normal. She exhibits no distension. There is no tenderness. Musculoskeletal: Normal range of motion. She exhibits no deformity. Lymphadenopathy:  
  She has no cervical adenopathy. Neurological: She is alert and oriented to person, place, and time. Coordination normal.  
Normal sensation to upper extremities. Normal motor function. Skin: Skin is warm and dry. No rash noted. Psychiatric: She has a normal mood and affect. Nursing note and vitals reviewed. Diagnostic Study Results Labs - Recent Results (from the past 12 hour(s)) EKG, 12 LEAD, INITIAL Collection Time: 03/19/19 11:20 PM  
Result Value Ref Range Ventricular Rate 73 BPM  
 Atrial Rate 73 BPM  
 P-R Interval 166 ms  
 QRS Duration 82 ms Q-T Interval 368 ms QTC Calculation (Bezet) 405 ms Calculated P Axis 70 degrees Calculated R Axis 23 degrees Calculated T Axis 28 degrees Diagnosis Normal sinus rhythm Nonspecific ST and T wave abnormality Abnormal ECG When compared with ECG of 17-MAR-2017 10:56, No significant change was found CBC WITH AUTOMATED DIFF Collection Time: 03/19/19 11:45 PM  
Result Value Ref Range WBC 8.7 4.6 - 13.2 K/uL  
 RBC 4.56 4.20 - 5.30 M/uL  
 HGB 13.1 12.0 - 16.0 g/dL HCT 39.9 35.0 - 45.0 % MCV 87.5 74.0 - 97.0 FL  
 MCH 28.7 24.0 - 34.0 PG  
 MCHC 32.8 31.0 - 37.0 g/dL  
 RDW 12.9 11.6 - 14.5 % PLATELET 636 591 - 950 K/uL MPV 9.7 9.2 - 11.8 FL  
 NEUTROPHILS 45 40 - 73 % LYMPHOCYTES 44 21 - 52 % MONOCYTES 7 3 - 10 % EOSINOPHILS 3 0 - 5 % BASOPHILS 1 0 - 2 %  
 ABS. NEUTROPHILS 4.0 1.8 - 8.0 K/UL  
 ABS. LYMPHOCYTES 3.8 (H) 0.9 - 3.6 K/UL  
 ABS. MONOCYTES 0.6 0.05 - 1.2 K/UL  
 ABS. EOSINOPHILS 0.2 0.0 - 0.4 K/UL  
 ABS. BASOPHILS 0.1 0.0 - 0.1 K/UL  
 DF AUTOMATED METABOLIC PANEL, COMPREHENSIVE Collection Time: 03/19/19 11:45 PM  
Result Value Ref Range Sodium 142 136 - 145 mmol/L Potassium 3.3 (L) 3.5 - 5.5 mmol/L Chloride 108 100 - 108 mmol/L  
 CO2 30 21 - 32 mmol/L Anion gap 4 3.0 - 18 mmol/L Glucose 90 74 - 99 mg/dL BUN 11 7.0 - 18 MG/DL Creatinine 0.99 0.6 - 1.3 MG/DL  
 BUN/Creatinine ratio 11 (L) 12 - 20 GFR est AA >60 >60 ml/min/1.73m2 GFR est non-AA 59 (L) >60 ml/min/1.73m2 Calcium 9.1 8.5 - 10.1 MG/DL  Bilirubin, total 0.3 0.2 - 1.0 MG/DL  
 ALT (SGPT) 20 13 - 56 U/L  
 AST (SGOT) 11 (L) 15 - 37 U/L  
 Alk. phosphatase 63 45 - 117 U/L Protein, total 7.5 6.4 - 8.2 g/dL Albumin 4.0 3.4 - 5.0 g/dL Globulin 3.5 2.0 - 4.0 g/dL A-G Ratio 1.1 0.8 - 1.7 CARDIAC PANEL,(CK, CKMB & TROPONIN) Collection Time: 03/19/19 11:45 PM  
Result Value Ref Range CK 96 26 - 192 U/L  
 CK - MB <1.0 <3.6 ng/ml CK-MB Index  0.0 - 4.0 % CALCULATION NOT PERFORMED WHEN RESULT IS BELOW LINEAR LIMIT Troponin-I, QT <0.02 0.0 - 0.045 NG/ML  
CARDIAC PANEL,(CK, CKMB & TROPONIN) Collection Time: 03/20/19  4:05 AM  
Result Value Ref Range CK 76 26 - 192 U/L  
 CK - MB <1.0 <3.6 ng/ml CK-MB Index  0.0 - 4.0 % CALCULATION NOT PERFORMED WHEN RESULT IS BELOW LINEAR LIMIT Troponin-I, QT <0.02 0.0 - 0.045 NG/ML Radiologic Studies -  
XR CHEST PORT    (Results Pending) Medical Decision Making I am the first provider for this patient. I reviewed the vital signs, available nursing notes, past medical history, past surgical history, family history and social history. Vital Signs-Reviewed the patient's vital signs. EKG: Interpreted by the EP. Normal sinus rhythm, rate 73, , QTc 405. No acute ST or T wave changes, no STEMI. Records Reviewed: Nursing Notes (Time of Review: 12:52 AM) 
 
ED Course: Progress Notes, Reevaluation, and Consults: 
 
Provider Notes (Medical Decision Making): Asuncion De La Cruz is a 47 y.o. female with No significant past medical history who presents with complaint of sharp left-sided chest pain with tingling in left fingers which began at 12 noon yesterday and has been continuously present since that time. The pain at this time is mild but present. Patient appears well, in no apparent distress at this time. EKG is unremarkable. Differential Diagnosis: Will consider ACS, GERD, Pericarditis/myocarditis, pleuritis, bronchitis or other respiratory infection, pneumothorax, pneumonia, MSK pain. Given the patient's history and exam, I do not suspect PE, aortic dissection, pancreatitis, esophageal rupture, pericardial tamponate, mediastenitis. Testing: CXR, ECG, CBC, CMP, Troponin. Treatments: pending evaluation Reevaluation, second troponin is negative. Patient is pain-free at this time. Will refer to cardiology for outpatient follow-up. The patient will be discharged home. Findings were discussed at length and questions were answered. Information on all newly prescribed medications was given. the patient was instructed to follow-up with cardiology, or return to the Emergency Department with any worsened symptoms or concerns. Return precautions were given. Diagnosis Clinical Impression: 1. Chest pain, unspecified type Disposition: discharge Follow-up Information Follow up With Specialties Details Why Contact Info Xiomy Meléndez MD Cardiology Schedule an appointment as soon as possible for a visit  [address] [place] [address] [address] [place] [address] 
[phone] [facility] EMERGENCY DEPT Emergency Medicine  If symptoms worsen [address] [id number] 
[phone] Discharge Medication List as of 3/20/2019  5:22 AM  
  
CONTINUE these medications which have NOT CHANGED Details  
cholecalciferol (VITAMIN D3) 1,000 unit tablet Take  by mouth daily. , Historical Med  
  
cyclobenzaprine (FLEXERIL) 10 mg tablet Take 1 Tab by mouth three (3) times daily as needed for Muscle Spasm(s). , Print, Disp-14 Tab, R-0  
  
acetaminophen-codeine (TYLENOL-CODEINE #3) 300-30 mg per tablet Take 1 Tab by mouth every four (4) hours as needed for Pain.  Max Daily Amount: 6 Tabs., Print, Disp-6 Tab, R-0

## 2019-03-20 NOTE — DISCHARGE INSTRUCTIONS

## 2020-12-11 ENCOUNTER — HOSPITAL ENCOUNTER (OUTPATIENT)
Dept: MAMMOGRAPHY | Age: 50
Discharge: HOME | End: 2020-12-11
Payer: COMMERCIAL

## 2020-12-11 DIAGNOSIS — Z12.31 VISIT FOR SCREENING MAMMOGRAM: ICD-10-CM

## 2020-12-11 PROCEDURE — 77063 BREAST TOMOSYNTHESIS BI: CPT

## 2021-08-03 PROBLEM — R55 SYNCOPE: Status: RESOLVED | Noted: 2017-03-14 | Resolved: 2021-08-03

## 2022-01-26 ENCOUNTER — TRANSCRIBE ORDER (OUTPATIENT)
Dept: SCHEDULING | Age: 52
End: 2022-01-26

## 2022-01-26 DIAGNOSIS — Z12.31 VISIT FOR SCREENING MAMMOGRAM: Primary | ICD-10-CM

## 2022-02-09 ENCOUNTER — HOSPITAL ENCOUNTER (OUTPATIENT)
Dept: MAMMOGRAPHY | Age: 52
Discharge: HOME | End: 2022-02-09
Payer: COMMERCIAL

## 2022-02-09 DIAGNOSIS — Z12.31 VISIT FOR SCREENING MAMMOGRAM: ICD-10-CM

## 2022-02-09 PROCEDURE — 77063 BREAST TOMOSYNTHESIS BI: CPT

## 2022-03-20 PROBLEM — R55 SYNCOPE AND COLLAPSE: Status: ACTIVE | Noted: 2017-03-14

## 2022-05-30 ENCOUNTER — HOSPITAL ENCOUNTER (EMERGENCY)
Age: 52
Discharge: HOME | End: 2022-05-30
Attending: EMERGENCY MEDICINE
Payer: COMMERCIAL

## 2022-05-30 ENCOUNTER — APPOINTMENT (OUTPATIENT)
Dept: CT IMAGING | Age: 52
End: 2022-05-30
Attending: PHYSICIAN ASSISTANT
Payer: COMMERCIAL

## 2022-05-30 VITALS
BODY MASS INDEX: 23.32 KG/M2 | TEMPERATURE: 98.2 F | SYSTOLIC BLOOD PRESSURE: 146 MMHG | DIASTOLIC BLOOD PRESSURE: 89 MMHG | HEIGHT: 65 IN | OXYGEN SATURATION: 98 % | WEIGHT: 140 LBS | HEART RATE: 100 BPM | RESPIRATION RATE: 16 BRPM
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
## 2022-05-30 DIAGNOSIS — S13.4XXA WHIPLASH INJURIES, INITIAL ENCOUNTER: ICD-10-CM

## 2022-05-30 PROCEDURE — 70450 CT HEAD/BRAIN W/O DYE: CPT

## 2022-05-30 PROCEDURE — 74011250637 HC RX REV CODE- 250/637: Performed by: PHYSICIAN ASSISTANT

## 2022-05-30 PROCEDURE — 72125 CT NECK SPINE W/O DYE: CPT

## 2022-05-30 PROCEDURE — 99284 EMERGENCY DEPT VISIT MOD MDM: CPT

## 2022-05-30 RX ORDER — HYDROCODONE BITARTRATE AND ACETAMINOPHEN 5; 325 MG/1; MG/1
1 TABLET ORAL
Status: COMPLETED | OUTPATIENT
Start: 2022-05-30 | End: 2022-05-30

## 2022-05-30 RX ORDER — KETOROLAC TROMETHAMINE 10 MG/1
10 TABLET, FILM COATED ORAL
Qty: 20 TABLET | Refills: 0 | Status: SHIPPED | OUTPATIENT
Start: 2022-05-30

## 2022-05-30 RX ORDER — DIAZEPAM 5 MG/1
5 TABLET ORAL
Qty: 15 TABLET | Refills: 0 | Status: SHIPPED | OUTPATIENT
Start: 2022-05-30

## 2022-05-30 RX ORDER — ACETAMINOPHEN 325 MG/1
650 TABLET ORAL
Qty: 20 TABLET | Refills: 0 | Status: SHIPPED | OUTPATIENT
Start: 2022-05-30

## 2022-05-30 RX ADMIN — HYDROCODONE BITARTRATE AND ACETAMINOPHEN 1 TABLET: 5; 325 TABLET ORAL at 15:05

## 2022-05-30 NOTE — PROGRESS NOTES
Delay of Care    Patient arrived in CT with the incorrect arm band on. The RN was notified and will come to CT to rectify the armband.

## 2022-05-30 NOTE — Clinical Note
[address] [place] [address] [facility] EMERGENCY DEPT  [address] [address] [phone]    Work/School Note    Date: 5/30/2022    To Whom It May concern:    Rheba Cooks was seen and treated today in the emergency room by the following provider(s):  Attending Provider: Carli Rosado MD  Physician Assistant: ANNE Gustafson. Rheba Cooks is excused from work/school on 5/30/2022 through 6/1/2022. She is medically clear to return to work/school on 6/2/2022.          Sincerely,          ANNE Mcpherson
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
## 2022-05-30 NOTE — Clinical Note
[address] [place] [address] [facility] EMERGENCY DEPT  [id number] [address] [phone]    Work/School Note    Date: 5/30/2022    To Whom It May concern:    Herminia Araujo was seen and treated today in the emergency room by the following provider(s):  Attending Provider: Gisella Li MD  Physician Assistant: ANNE Sehr. Herminia Araujo is excused from work/school on 5/30/2022 through 6/1/2022. She is medically clear to return to work/school on 6/2/2022.          Sincerely,          ANNE Quintanilla

## 2022-06-09 ENCOUNTER — HOSPITAL ENCOUNTER (OUTPATIENT)
Dept: PHYSICAL THERAPY | Age: 52
Discharge: HOME | End: 2022-06-09
Payer: COMMERCIAL

## 2022-06-09 PROCEDURE — 97140 MANUAL THERAPY 1/> REGIONS: CPT

## 2022-06-09 PROCEDURE — 97162 PT EVAL MOD COMPLEX 30 MIN: CPT

## 2022-06-09 PROCEDURE — 97110 THERAPEUTIC EXERCISES: CPT

## 2022-06-09 NOTE — PROGRESS NOTES
PT DAILY TREATMENT NOTE 10-18    Patient Name: Jeff Lockwood  Date:2022  : 1970  [x]  Patient  Verified  Payor: Chika Spears / Plan: Meka Hair PPO / Product Type: PPO /    In time:213  Out time:249  Total Treatment Time (min): 36  Visit #: 1 of 8    Treatment Area: Neck pain [M54.2]    SUBJECTIVE  Pain Level (0-10 scale): 9  Any medication changes, allergies to medications, adverse drug reactions, diagnosis change, or new procedure performed?: [x] No    [] Yes (see summary sheet for update)  Subjective functional status/changes:   [] No changes reported  See eval    OBJECTIVE       14 min [x]Eval                  []Re-Eval       14 min Therapeutic Exercise:  [] See flow sheet :   Rationale: increase ROM to improve the patients ability to perform daily activities        8 min Manual Therapy:  SOR; C/S PROM; STM B C/S paraspinals    The manual therapy interventions were performed at a separate and distinct time from the therapeutic activities interventions. Rationale: decrease pain, increase ROM, increase tissue extensibility and decrease trigger points to perform daily activities   With   [] TE   [] TA   [] neuro   [] other: Patient Education: [x] Review HEP    [] Progressed/Changed HEP based on:   [] positioning   [] body mechanics   [] transfers   [] heat/ice application    [] other:      Other Objective/Functional Measures:      Pain Level (0-10 scale) post treatment: 9    ASSESSMENT/Changes in Function: see POC    Patient will continue to benefit from skilled PT services to modify and progress therapeutic interventions, address functional mobility deficits, address ROM deficits, address strength deficits, analyze and address soft tissue restrictions, analyze and cue movement patterns and assess and modify postural abnormalities to attain remaining goals.      [x]  See Plan of Care  []  See progress note/recertification  []  See Discharge Summary         Progress towards goals / Updated goals:  Short Term Goals: To be accomplished in 2 weeks:  1. I and compliant with HEP for self management of symptoms. IE: issued HEP  2. Increase C/S B rotation to 45 degrees to increase ease with daily activities. IE: right 30; left 25  Long Term Goals: To be accomplished in 4 weeks:  1. Improve FOTO to 60 to indicate improved function with daily activities. IE: 44  2. Increase C/S B rotation to 60 degrees to increase ease with driving. IE: right 30; left 25  3. Increase C/S B lateral flexion to 25 degrees to increase ease with work tasks. IE: right 9; left 15  4. Increase right shoulder active elevation to 160 degrees to increase ease with overhead reaching.    IE: 140 with pain  PLAN  []  Upgrade activities as tolerated     [x]  Continue plan of care  []  Update interventions per flow sheet       []  Discharge due to:_  []  Other:_      Khalida Thompson, MPT, CMTPT 6/9/2022  3:04 PM    Future Appointments   Date Time Provider Lindsey Felicia   6/13/2022  6:00 PM César Casas, PT [facility]   6/16/2022  6:00 PM César Casas, PT [facility]   6/20/2022  5:15 PM Tara Wilson, PT [facility]   6/22/2022  4:00 PM Geraldine Traore, PTA [facility]
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
## 2022-06-13 ENCOUNTER — HOSPITAL ENCOUNTER (OUTPATIENT)
Dept: PHYSICAL THERAPY | Age: 52
Discharge: HOME | End: 2022-06-13
Payer: COMMERCIAL

## 2022-06-13 PROCEDURE — 97112 NEUROMUSCULAR REEDUCATION: CPT

## 2022-06-13 PROCEDURE — 97110 THERAPEUTIC EXERCISES: CPT

## 2022-06-13 NOTE — PROGRESS NOTES
PT DAILY TREATMENT NOTE     Patient Name: Fabi Workman  GITD:  : 1970  [x]  Patient  Verified  Payor: Angelo Bernardo / Plan: Marilee Garza PPO / Product Type: PPO /    In time:5:12  Out time:5:50  Total Treatment Time (min): 38  Visit #: 2 of 8     Treatment Area: Neck pain [M54.2]    SUBJECTIVE  Pain Level (0-10 scale): 7/10  Any medication changes, allergies to medications, adverse drug reactions, diagnosis change, or new procedure performed?: [x] No    [] Yes (see summary sheet for update)  Subjective functional status/changes:   [] No changes reported  The patient reports she hs been working on her exercises and feels her pain is not as bad today as a result. The patient does report compliance with HEP. OBJECTIVE  26 min Therapeutic Exercise:  [] See flow sheet :   Rationale: increase ROM and increase strength to improve the patients ability to improve ADL ease. 12 min Neuromuscular Re-education:  []  See flow sheet :   Rationale: increase ROM and increase strength  to improve the patients ability to improve ADL ease. With   [] TE   [] TA   [] neuro   [] other: Patient Education: [x] Review HEP    [] Progressed/Changed HEP based on:   [] positioning   [] body mechanics   [] transfers   [] heat/ice application    [] other:      Other Objective/Functional Measures: The patient attained about ~100 degrees in supine wand flexion utilizing stick. The patient did have some pain in right sidelying, thus assumed quarter turn supine to stay off of right shoulder. Pain Level (0-10 scale) post treatment: 7/10    ASSESSMENT/Changes in Function: The patient was able to complete interventions as prescribed. She does have more pain through her right shoulder as compared to her left notable through [facility] and AROM. Opted to hold standing flexion/scaption today. She does report compliance with HEP and leaves in no apparent distress, thanking PT post session denying modalities.      Patient will continue to benefit from skilled PT services to modify and progress therapeutic interventions, address functional mobility deficits, address ROM deficits, address strength deficits, analyze and address soft tissue restrictions, analyze and cue movement patterns, analyze and modify body mechanics/ergonomics, assess and modify postural abnormalities and instruct in home and community integration to attain remaining goals. []  See Plan of Care  []  See progress note/recertification  []  See Discharge Summary         Progress towards goals / Updated goals:  Short Term Goals: To be accomplished in 2 weeks:  1. I and compliant with HEP for self management of symptoms. IE: issued HEP   Current: Met - reports compliance 6/13/2022.   2. Increase C/S B rotation to 45 degrees to increase ease with daily activities.   IE: right 30; left 25  Long Term Goals: To be accomplished in 4 weeks:  1. Improve FOTO to 60 to indicate improved function with daily activities. IE: 44  2. Increase C/S B rotation to 60 degrees to increase ease with driving. IE: right 30; left 25  3. Increase C/S B lateral flexion to 25 degrees to increase ease with work tasks. IE: right 9; left 15  4.  Increase right shoulder active elevation to 160 degrees to increase ease with overhead reaching.   IE: 140 with pain     PLAN  [x]  Upgrade activities as tolerated     []  Continue plan of care  []  Update interventions per flow sheet       []  Discharge due to:_  []  Other:_      Ekta Toth, PT 6/13/2022  5:12 PM    Future Appointments   Date Time Provider Lindsey Duarte   6/13/2022  5:15 PM Tammie Boland, PT [facility]   6/16/2022  6:00 PM Tammie Boland PT [facility]   6/20/2022  5:15 PM Tammie Boland, PT [facility]   6/22/2022  4:00 PM Edgar Lawson, STUART [facility]

## 2022-06-16 ENCOUNTER — HOSPITAL ENCOUNTER (OUTPATIENT)
Dept: PHYSICAL THERAPY | Age: 52
Discharge: HOME | End: 2022-06-16
Payer: COMMERCIAL

## 2022-06-16 PROCEDURE — 97112 NEUROMUSCULAR REEDUCATION: CPT

## 2022-06-16 PROCEDURE — 97110 THERAPEUTIC EXERCISES: CPT

## 2022-06-16 NOTE — PROGRESS NOTES
PT DAILY TREATMENT NOTE     Patient Name: Herminia Araujo  WRQX:  : 1970  [x]  Patient  Verified  Payor: Anirudh Wakefield / Plan: Beverly Perez PPO / Product Type: PPO /    In time:5:55  Out time: 6:40  Total Treatment Time (min): 45  Visit #: 3 of 8    Treatment Area: Neck pain [M54.2]    SUBJECTIVE  Pain Level (0-10 scale): 5.5/10  Any medication changes, allergies to medications, adverse drug reactions, diagnosis change, or new procedure performed?: [x] No    [] Yes (see summary sheet for update)  Subjective functional status/changes:   [] No changes reported  The patient reports that she is gradually improving and her pain is coming down. OBJECTIVE  Modality rationale: decrease pain and increase tissue extensibility to improve the patients ability to improve ADL ease. Min Type Additional Details   10 []  Ice     [x]  heat  []  Ice massage  []  Laser   []  Anodyne Position: seated  Location:  Right UT/LS region   [] Skin assessment post-treatment:  []intact []redness- no adverse reaction    []redness - adverse reaction:     25 min Therapeutic Exercise:  [x] See flow sheet :   Rationale: increase ROM and increase strength to improve the patients ability to improve ADL ease. 10 min Neuromuscular Re-education:  [x]  See flow sheet :   Rationale: increase ROM and increase strength  to improve the patients ability to improve ADL ease. With   [] TE   [] TA   [] neuro   [] other: Patient Education: [x] Review HEP    [] Progressed/Changed HEP based on:   [] positioning   [] body mechanics   [] transfers   [] heat/ice application    [] other:      Other Objective/Functional Measures: Added pulleys due to limitations into overhead flexion of right UEs.      Cervical rotation: 30 degrees right, 40 degrees left  Pain Level (0-10 scale) post treatment: 5.5/10    ASSESSMENT/Changes in Function: The patient demonstrate notable improvement in passive right shoulder passive elevation with child's pose as compared to wand flexion and pulleys. She continues to be limited largely by right sided UT/LS and c spine pain. Notable improvements in pain levels over past few visits. She leaves in no greater pain thanking PT upon departure. Patient will continue to benefit from skilled PT services to modify and progress therapeutic interventions, address functional mobility deficits, address ROM deficits, address strength deficits, analyze and address soft tissue restrictions, analyze and cue movement patterns, analyze and modify body mechanics/ergonomics, assess and modify postural abnormalities and instruct in home and community integration to attain remaining goals. []  See Plan of Care  []  See progress note/recertification  []  See Discharge Summary         Progress towards goals / Updated goals:  Short Term Goals: To be accomplished in 2 weeks:  1. I and compliant with HEP for self management of symptoms. IE: issued HEP   Current: Met - reports compliance 6/13/2022.   2. Increase C/S B rotation to 45 degrees to increase ease with daily activities.   IE: right 30; left 25  Current: Progressed left to 40 degrees, right remains 30 degrees 6/16/2022   Long Term Goals: To be accomplished in 4 weeks:  1. Improve FOTO to 60 to indicate improved function with daily activities.   IE: 39  2. Increase C/S B rotation to 60 degrees to increase ease with driving. IE: right 30; left 25  Current: Progressed left to 40 degrees, right remains 30 degrees 6/16/2022   3. Increase C/S B lateral flexion to 25 degrees to increase ease with work tasks. IE: right 9; left 15  4.  Increase right shoulder active elevation to 160 degrees to increase ease with overhead reaching.   IE: 140 with pain    PLAN  [x]  Upgrade activities as tolerated     []  Continue plan of care  []  Update interventions per flow sheet       []  Discharge due to:_  []  Other:_      Sergey Sanchez, PT 6/16/2022  5:59 PM    Future Appointments   Date Time Provider Lindsey Duarte   6/16/2022  6:00 PM Amador Munguia [place] [facility]   6/20/2022  5:15 PM Jayne Wilson, PT [facility]   6/22/2022  4:00 PM [facility]ophe Dubin, [facility] Smock[facility]

## 2022-06-20 ENCOUNTER — HOSPITAL ENCOUNTER (OUTPATIENT)
Dept: PHYSICAL THERAPY | Age: 52
Discharge: HOME | End: 2022-06-20
Payer: COMMERCIAL

## 2022-06-20 PROCEDURE — 97112 NEUROMUSCULAR REEDUCATION: CPT

## 2022-06-20 PROCEDURE — 97110 THERAPEUTIC EXERCISES: CPT

## 2022-06-20 PROCEDURE — 97140 MANUAL THERAPY 1/> REGIONS: CPT

## 2022-06-20 NOTE — PROGRESS NOTES
PT DAILY TREATMENT NOTE     Patient Name: Nikhil Dickerson  Date:2022  : 1970  [x]  Patient  Verified  Payor: Fidencio Koch / Plan: [id number] [place] PPO / Product Type: PPO /    In time:5:12  Out time:6:03  Total Treatment Time (min): 51  Visit #: 4 of 8    Treatment Area: Neck pain [M54.2]    SUBJECTIVE  Pain Level (0-10 scale): 4/10  Any medication changes, allergies to medications, adverse drug reactions, diagnosis change, or new procedure performed?: [x] No    [] Yes (see summary sheet for update)  Subjective functional status/changes:   [] No changes reported  \"I'm feeling much better. \" She indicates she is able to pull her right arm up better, but actively is having a hard time reaching overhead. OBJECTIVE  Modality rationale: decrease pain and increase tissue extensibility to improve the patients ability to improve ADL ease. Min Type Additional Details   10 []  Ice     [x]  heat  []  Ice massage  []  Laser   []  Anodyne Position: seated  Location: right side of cervical spine (UT/LS)   [] Skin assessment post-treatment:  []intact []redness- no adverse reaction    []redness - adverse reaction:     21 min Therapeutic Exercise:  [x] See flow sheet :   Rationale: increase ROM and increase strength to improve the patients ability to improve ADL ease. 12 min Neuromuscular Re-education:  []  See flow sheet :   Rationale: increase ROM and increase strength  to improve the patients ability to improve ADL ease. 8 min Manual Therapy:  Right UT/LS MFR with patient in seated position. SOR performed with the patient in supine. The manual therapy interventions were performed at a separate and distinct time from the therapeutic activities interventions. Rationale: decrease pain, increase ROM and increase tissue extensibility to improve ADL ease.          With   [] TE   [] TA   [] neuro   [] other: Patient Education: [x] Review HEP    [] Progressed/Changed HEP based on:   [] positioning   [] body mechanics   [] transfers   [] heat/ice application    [] other:      Other Objective/Functional Measures:   Cervical side-bending: 15 degrees right; 25 degrees left     AROM right shoulder: 125 degrees flexion; 70 degrees ABD due to lateral/anterior shoulder pain cited. PROM Right shoulder:  Flexion: 150 degrees  ABD: 140 degrees  ER 90/90: 90 degrees  IR 90/90: NT    Pain Level (0-10 scale) post treatment: 4/10    ASSESSMENT/Changes in Function: The patient does attain fair passive ROM of right shoulder with notable discomfort into end ranges, but experiences pain actively. Her cervical sidebending has improved since [facility] as has her rotation. Her pain levels have gradually decreased and her chief complaint cited has been right shoulder. She will continue to benefit from skilled PT to address remaining impairments. Patient will continue to benefit from skilled PT services to modify and progress therapeutic interventions, address functional mobility deficits, address ROM deficits, address strength deficits, analyze and address soft tissue restrictions, analyze and cue movement patterns, analyze and modify body mechanics/ergonomics, assess and modify postural abnormalities and instruct in home and community integration to attain remaining goals. []  See Plan of Care  []  See progress note/recertification  []  See Discharge Summary         Progress towards goals / Updated goals:  Short Term Goals: To be accomplished in 2 weeks:  1. I and compliant with HEP for self management of symptoms. IE: issued HEP   Current: Met - reports compliance 6/13/2022.   2. Increase C/S B rotation to 45 degrees to increase ease with daily activities.   IE: right 30; left 25  Current: Progressed left to 40 degrees, right remains 30 degrees 6/16/2022   Long Term Goals: To be accomplished in 4 weeks:  1. Improve FOTO to 60 to indicate improved function with daily activities.   IE: 39  2.  Increase C/S B rotation to 60 degrees to increase ease with driving. IE: right 30; left 25  Current: Progressed left to 40 degrees, right remains 30 degrees 6/16/2022   3. Increase C/S B lateral flexion to 25 degrees to increase ease with work tasks. IE: right 9; left 15  Current: Nearly met - 15 degrees right; left 25 degrees  4.  Increase right shoulder active elevation to 160 degrees to increase ease with overhead reaching.   IE: 140 with pain   Current: Regressed to 125 degrees 6/20/2022    PLAN  [x]  Upgrade activities as tolerated     []  Continue plan of care  []  Update interventions per flow sheet       []  Discharge due to:_  []  Other:_      Page Santillan, PT 6/20/2022  5:11 PM    Future Appointments   Date Time Provider Lindsey Duarte   6/20/2022  5:15 PM Aurelia Smith, PT [facility]   6/22/2022  4:00 PM Liam Traore, PTA [facility]

## 2022-06-22 ENCOUNTER — HOSPITAL ENCOUNTER (OUTPATIENT)
Dept: PHYSICAL THERAPY | Age: 52
Discharge: HOME | End: 2022-06-22
Payer: COMMERCIAL

## 2022-06-22 PROCEDURE — 97530 THERAPEUTIC ACTIVITIES: CPT

## 2022-06-22 PROCEDURE — 97110 THERAPEUTIC EXERCISES: CPT

## 2022-06-22 PROCEDURE — 97140 MANUAL THERAPY 1/> REGIONS: CPT

## 2022-06-27 ENCOUNTER — TELEPHONE (OUTPATIENT)
Dept: PHYSICAL THERAPY | Age: 52
End: 2022-06-27

## 2022-07-12 ENCOUNTER — APPOINTMENT (OUTPATIENT)
Dept: PHYSICAL THERAPY | Age: 52
End: 2022-07-12
Payer: COMMERCIAL

## 2022-07-14 ENCOUNTER — TELEPHONE (OUTPATIENT)
Dept: PHYSICAL THERAPY | Age: 52
End: 2022-07-14

## 2022-07-15 ENCOUNTER — TELEPHONE (OUTPATIENT)
Dept: PHYSICAL THERAPY | Age: 52
End: 2022-07-15

## 2022-07-18 ENCOUNTER — APPOINTMENT (OUTPATIENT)
Dept: PHYSICAL THERAPY | Age: 52
End: 2022-07-18
Payer: COMMERCIAL

## 2022-07-20 ENCOUNTER — HOSPITAL ENCOUNTER (OUTPATIENT)
Dept: PHYSICAL THERAPY | Age: 52
Discharge: HOME | End: 2022-07-20
Payer: COMMERCIAL

## 2022-07-20 PROCEDURE — 97535 SELF CARE MNGMENT TRAINING: CPT

## 2022-07-20 NOTE — PROGRESS NOTES
PT DISCHARGE DAILY NOTE AND [id number]    Patient name: Larissa Eng Start of Care: 22   Referral source: Jerome Sever, DO : 1970   Medical/Treatment Diagnosis: Neck pain [M54.2] Onset Date:22     Prior Hospitalization: see medical history Provider#: [id number]   Medications: Verified on Patient Summary List    Comorbidities: none reported  Prior Level of Function:; no difficulty with daily activities    Visits from Start of Care: 6    Missed Visits: 1    Reporting Period : 22 to 22    Date:2022  : 1970  [x]  Patient  Verified  Payor: Adalgisa Rain / Plan: Belen Roy PPO / Product Type: PPO /    In time:342  Out time:355  Total Treatment Time (min): 13  Visit #: 6 of 8        SUBJECTIVE  Pain Level (0-10 scale): 0  Any medication changes, allergies to medications, adverse drug reactions, diagnosis change, or new procedure performed?: [x] No    [] Yes (see summary sheet for update)  Subjective functional status/changes:   [] No changes reported  I feel great. No pain in my neck or shoulder. Reports no problems with daily activities. OBJECTIVE    13 min Self Care/Home Management: D/C instruct/reassessment of goals   Rationale: increase ROM and increase strength  to improve the patients ability to maintain pain-free ADLs and daily activities           With   [] TE   [] TA   [] neuro   [] other: Patient Education: [x] Review HEP    [] Progressed/Changed HEP based on:   [] positioning   [] body mechanics   [] transfers   [] heat/ice application    [] other:      Other Objective/Functional Measures: see below     Pain Level (0-10 scale) post treatment: 0    Summary of Care:  Short Term Goals: To be accomplished in 2 weeks:  1. I and compliant with HEP for self management of symptoms. IE: issued HEP   Current: Met - reports compliance . 2. Increase C/S B rotation to 45 degrees to increase ease with daily activities.    IE: right 30; left 25  Current: 75 right; 65 left goal met  Long Term Goals: To be accomplished in 4 weeks:  1. Improve FOTO to 60 to indicate improved function with daily activities. IE: 39  Current: 76  2. Increase C/S B rotation to 60 degrees to increase ease with driving. IE: right 30; left 25  Current:  75 right; 65 left  3. Increase C/S B lateral flexion to 25 degrees to increase ease with work tasks. IE: right 9; left 15  Current: 25 degrees B goal met  4. Increase right shoulder active elevation to 160 degrees to increase ease with overhead reaching. IE: 140 with pain   Current: 160 degrees without pain- goal met  ASSESSMENT/Changes in Function: All goals have been met; D/C to HEP for maintenance.     Thank you for this referral!      PLAN  [x]Discontinue therapy: [x]Patient has reached or is progressing toward set goals      []Patient is non-compliant or has abdicated      []Due to lack of appreciable progress towards set [address], PT 7/20/2022  3:44 PM

## 2022-07-25 ENCOUNTER — APPOINTMENT (OUTPATIENT)
Dept: PHYSICAL THERAPY | Age: 52
End: 2022-07-25
Payer: COMMERCIAL

## 2022-07-27 ENCOUNTER — APPOINTMENT (OUTPATIENT)
Dept: PHYSICAL THERAPY | Age: 52
End: 2022-07-27
Payer: COMMERCIAL

## 2022-08-01 ENCOUNTER — APPOINTMENT (OUTPATIENT)
Dept: PHYSICAL THERAPY | Age: 52
End: 2022-08-01

## 2022-08-03 ENCOUNTER — APPOINTMENT (OUTPATIENT)
Dept: PHYSICAL THERAPY | Age: 52
End: 2022-08-03

## 2023-10-17 ENCOUNTER — HOSPITAL ENCOUNTER (OUTPATIENT)
Facility: HOSPITAL | Age: 53
Discharge: HOME | End: 2023-10-20
Payer: COMMERCIAL

## 2023-10-17 VITALS — HEIGHT: 64 IN | WEIGHT: 140 LBS | BODY MASS INDEX: 23.9 KG/M2

## 2023-10-17 DIAGNOSIS — Z12.31 VISIT FOR SCREENING MAMMOGRAM: ICD-10-CM

## 2023-10-17 PROCEDURE — 77063 BREAST TOMOSYNTHESIS BI: CPT
